# Patient Record
Sex: FEMALE | Race: BLACK OR AFRICAN AMERICAN | NOT HISPANIC OR LATINO | Employment: FULL TIME | URBAN - METROPOLITAN AREA
[De-identification: names, ages, dates, MRNs, and addresses within clinical notes are randomized per-mention and may not be internally consistent; named-entity substitution may affect disease eponyms.]

---

## 2017-07-18 DIAGNOSIS — R06.02 SOB (SHORTNESS OF BREATH): Primary | ICD-10-CM

## 2017-07-19 LAB
DLCOUNC-%PRED-PRE: 88 %
DLCOUNC-PRE: 21.06 ML/MIN/MMHG
DLCOUNC-PRED: 23.79 ML/MIN/MMHG
ERV-%PRED-PRE: 39 %
ERV-PRE: 0.43 L
ERV-PRED: 1.08 L
EXPTIME-PRE: 7.01 SEC
FEF2575-%PRED-POST: 81 %
FEF2575-%PRED-PRE: 42 %
FEF2575-POST: 2.44 L/SEC
FEF2575-PRE: 1.27 L/SEC
FEF2575-PRED: 2.99 L/SEC
FEFMAX-%PRED-PRE: 52 %
FEFMAX-PRE: 3.68 L/SEC
FEFMAX-PRED: 6.96 L/SEC
FEV1-%PRED-PRE: 49 %
FEV1-PRE: 1.39 L
FEV1FEV6-PRE: 80 %
FEV1FEV6-PRED: 84 %
FEV1FVC-PRE: 80 %
FEV1FVC-PRED: 83 %
FEV1SVC-PRE: 81 %
FEV1SVC-PRED: 76 %
FIFMAX-PRE: 2.86 L/SEC
FRCPLETH-%PRED-PRE: 90 %
FRCPLETH-PRE: 2.43 L
FRCPLETH-PRED: 2.68 L
FVC-%PRED-PRE: 51 %
FVC-PRE: 1.75 L
FVC-PRED: 3.38 L
IC-%PRED-PRE: 49 %
IC-PRE: 1.3 L
IC-PRED: 2.6 L
RVPLETH-%PRED-PRE: 126 %
RVPLETH-PRE: 2.01 L
RVPLETH-PRED: 1.59 L
TLCPLETH-%PRED-PRE: 75 %
TLCPLETH-PRE: 3.73 L
TLCPLETH-PRED: 4.94 L
VA-%PRED-PRE: 59 %
VA-PRE: 2.99 L
VC-%PRED-PRE: 46 %
VC-PRE: 1.72 L
VC-PRED: 3.68 L

## 2017-07-28 ENCOUNTER — HOSPITAL ENCOUNTER (OUTPATIENT)
Dept: GENERAL RADIOLOGY | Facility: CLINIC | Age: 42
Discharge: HOME | End: 2017-07-28
Attending: INTERNAL MEDICINE | Admitting: INTERNAL MEDICINE
Payer: COMMERCIAL

## 2017-07-28 DIAGNOSIS — R76.11 MANTOUX: POSITIVE: ICD-10-CM

## 2017-07-28 PROCEDURE — 71020 XR CHEST 2 VW: CPT

## 2018-06-14 NOTE — TELEPHONE ENCOUNTER
FUTURE VISIT INFORMATION      FUTURE VISIT INFORMATION:    Date: 6/18/18    Time: 2:30    Location:   REFERRAL INFORMATION:    Referring provider:  Self    Referring providers clinic:      Reason for visit/diagnosis: Rt shoulder pain

## 2018-06-18 ENCOUNTER — PRE VISIT (OUTPATIENT)
Dept: ORTHOPEDICS | Facility: CLINIC | Age: 43
End: 2018-06-18

## 2018-06-18 ENCOUNTER — OFFICE VISIT (OUTPATIENT)
Dept: ORTHOPEDICS | Facility: CLINIC | Age: 43
End: 2018-06-18
Payer: COMMERCIAL

## 2018-06-18 VITALS — SYSTOLIC BLOOD PRESSURE: 114 MMHG | HEART RATE: 67 BPM | DIASTOLIC BLOOD PRESSURE: 77 MMHG | RESPIRATION RATE: 16 BRPM

## 2018-06-18 DIAGNOSIS — M25.311 INSTABILITY OF RIGHT SHOULDER JOINT: Primary | ICD-10-CM

## 2018-06-18 NOTE — LETTER
6/18/2018      RE: Berkley Connelly  [address]  [address]  [place] [address]       She is here today actually being referred for a second opinion for surgical repair of her unstable right shoulder.  She has seen Dr. Clifford in the past.  I believe that she is seeing her today.  There is no charge for today's visit.  I placed a referral and will schedule an appointment for a second opinion by Dr. Clifford.         Vladimir Gant MD

## 2018-06-18 NOTE — MR AVS SNAPSHOT
After Visit Summary   2018    Berkley Connelly    MRN: [id number]           Patient Information     Date Of Birth          1975        Visit Information        Provider Department      2018 2:30 PM Vladimir Gant MD [facility]        Today's Diagnoses     Instability of right shoulder joint    -  1       Follow-ups after your visit        Your next 10 appointments already scheduled     2018  2:10 PM CDT   (Arrive by 1:55 PM)   NEW SHOULDER with Adrien Hampton MD   [facility] ([facility])    [address] [place] [id number] [phone]              Who to contact     Please call your clinic at [phone] to:    Ask questions about your health    Make or cancel appointments    Discuss your medicines    Learn about your test results    Speak to your doctor            Additional Information About Your Visit        MyChart Information     [employer] is an electronic gateway that provides easy, online access to your medical records. With [employer], you can request a clinic appointment, read your test results, renew a prescription or communicate with your care team.     To sign up for [employer] visit the website at [email or web address]   You will be asked to enter the access code listed below, as well as some personal information. Please follow the directions to create your username and password.     Your access code is: [email or web address]  Expires: 2018  6:30 AM     Your access code will  in 90 days. If you need help or a new code, please contact your [facility] or call [phone] for assistance.        Care EveryWhere ID     This is your Care EveryWhere ID. This could be used by other organizations to access your [facility] medical records  [id number]        Your Vitals Were     Pulse Respirations                67 16           Blood Pressure  from Last 3 Encounters:   06/18/18 114/77   12/31/13 (!) 136/98   05/24/13 124/77    Weight from Last 3 Encounters:   12/31/13 167 lb (75.8 kg)   11/21/12 168 lb (76.2 kg)   09/19/12 160 lb (72.6 kg)              Today, you had the following     No orders found for display       Primary Care Provider Fax #    Physician No Ref-Primary [phone]       No address on file        Equal Access to Services     MARTINE NUNO : Hadii aad ku hadasho Soomaali, waaxda luqadaha, qaybta kaalmada adeegyada, waxay katerinein haymadain irma atkinson . So [facility] [phone].    ATENCIÓN: Si habla español, tiene a muñoz disposición servicios gratuitos de asistencia lingüística. Llame al [phone].    We comply with applicable federal civil rights laws and [place] laws. We do not discriminate on the basis of race, color, national origin, age, disability, sex, sexual orientation, or gender identity.            Thank you!     Thank you for choosing [facility]  for your care. Our goal is always to provide you with excellent care. Hearing back from our patients is one way we can continue to improve our services. Please take a few minutes to complete the written survey that you may receive in the mail after your visit with us. Thank you!             Your Updated Medication List - Protect others around you: Learn how to safely use, store and throw away your medicines at www.disposemymeds.org.          This list is accurate as of 6/18/18  3:01 PM.  Always use your most recent med list.                   Brand Name Dispense Instructions for use Diagnosis    acetaminophen-codeine 300-30 MG per tablet    TYLENOL WITH CODEINE #3    15 tablet    Take 1-2 tablets by mouth every 6 hours as needed for pain        hydroquinone 4 % Crea      Externally apply  topically.

## 2018-06-18 NOTE — PROGRESS NOTES
She is here today actually being referred for a second opinion for surgical repair of her unstable right shoulder.  She has seen Dr. Clifford in the past.  I believe that she is seeing her today.  There is no charge for today's visit.  I placed a referral and will schedule an appointment for a second opinion by Dr. Clifford.

## 2018-06-28 ENCOUNTER — PRE VISIT (OUTPATIENT)
Dept: ORTHOPEDICS | Facility: CLINIC | Age: 43
End: 2018-06-28

## 2018-06-28 NOTE — TELEPHONE ENCOUNTER
FUTURE VISIT INFORMATION      FUTURE VISIT INFORMATION:    Date: 7/9    Time: 2:10    [place] [facility] [place]  REFERRAL INFORMATION:    Referring provider:      Referring providers clinic:      Reason for visit/diagnosis  R shoulder pain    RECORDS REQUESTED FROM:       Clinic name Comments Records Status Imaging Status   [facility] [place]   [facility] PT      [facility] Previous pt of Dr Clifford                         RECORDS STATUS

## 2018-07-05 DIAGNOSIS — G89.29 CHRONIC RIGHT SHOULDER PAIN: Primary | ICD-10-CM

## 2018-07-05 DIAGNOSIS — M25.511 CHRONIC RIGHT SHOULDER PAIN: Primary | ICD-10-CM

## 2018-10-15 ENCOUNTER — HOSPITAL ENCOUNTER (OUTPATIENT)
Dept: GENERAL RADIOLOGY | Facility: CLINIC | Age: 43
Discharge: HOME | End: 2018-10-15
Attending: NURSE PRACTITIONER | Admitting: NURSE PRACTITIONER
Payer: COMMERCIAL

## 2018-10-15 DIAGNOSIS — Z11.1 TUBERCULOSIS SCREENING: ICD-10-CM

## 2018-10-15 PROCEDURE — 71046 X-RAY EXAM CHEST 2 VIEWS: CPT

## 2018-11-12 ENCOUNTER — OFFICE VISIT (OUTPATIENT)
Dept: ORTHOPEDICS | Facility: CLINIC | Age: 43
End: 2018-11-12
Payer: COMMERCIAL

## 2018-11-12 ENCOUNTER — RADIANT APPOINTMENT (OUTPATIENT)
Dept: GENERAL RADIOLOGY | Facility: CLINIC | Age: 43
End: 2018-11-12
Attending: ORTHOPAEDIC SURGERY
Payer: COMMERCIAL

## 2018-11-12 VITALS — HEIGHT: 62 IN | BODY MASS INDEX: 33.49 KG/M2 | WEIGHT: 182 LBS

## 2018-11-12 DIAGNOSIS — G89.29 CHRONIC RIGHT SHOULDER PAIN: ICD-10-CM

## 2018-11-12 DIAGNOSIS — M25.511 CHRONIC RIGHT SHOULDER PAIN: ICD-10-CM

## 2018-11-12 DIAGNOSIS — M25.311 INSTABILITY OF RIGHT SHOULDER JOINT: Primary | ICD-10-CM

## 2018-11-12 RX ORDER — FLUTICASONE PROPIONATE 220 UG/1
1 AEROSOL, METERED RESPIRATORY (INHALATION) 2 TIMES DAILY PRN
COMMUNITY
End: 2023-01-20

## 2018-11-12 NOTE — NURSING NOTE
Teaching Flowsheet   Relevant Diagnosis: Right shoulder instability  Teaching Topic: Pre-op for allograft reconstruction of glenoid bone loss -  Prior auth needed for allograft  and patient needs a negative nicotine test before surgery can be scheduled.  First test will be done after allograft is approved.     Person(s) involved in teaching:   Patient     Motivation Level:  Asks Questions: Yes  Cooperative: Yes  Receptive (willing/able to accept information): Yes  Any cultural factors/Tenriism beliefs that may influence understanding or compliance? No     Patient demonstrates understanding of the following:  Reason for the appointment, diagnosis and treatment plan: Yes  Knowledge of proper use of medications and conditions for which they are ordered (with special attention to potential side effects or drug interactions): Yes  Which situations necessitate calling provider and whom to contact: Yes     Teaching Concerns Addressed:   Patient aware of the necessity for smoking cessation  Pre-op H&P by PMD  States she will contact a friend to assist with cares and transportation after surgery     Proper use and care of sling x 6 weeks (medical equip, care aids, etc.): Yes  Nutritional needs and diet plan: Yes  Pain management techniques: Yes  Wound Care: Yes  How and/when to access community resources: Yes     Instructional Materials Used/Given: Pre-op packet, surgical soap     Time spent with patient: 15.
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
## 2018-11-12 NOTE — MR AVS SNAPSHOT
"              After Visit Summary   2018    Berkley Connelly    MRN: [id number]           Patient Information     Date Of Birth          1975        Visit Information        Provider Department      2018 1:30 PM Adrien Hampton MD [facility]        Today's Diagnoses     Instability of right shoulder joint    -  1       Follow-ups after your visit        Follow-up notes from your care team     Return in about 6 weeks (around 2018).      Who to contact     Please call your clinic at [phone] to:    Ask questions about your health    Make or cancel appointments    Discuss your medicines    Learn about your test results    Speak to your doctor            Additional Information About Your Visit        MyChart Information     [employer] is an electronic gateway that provides easy, online access to your medical records. With [employer], you can request a clinic appointment, read your test results, renew a prescription or communicate with your care team.     To sign up for [employer] visit the website at [email or web address]   You will be asked to enter the access code listed below, as well as some personal information. Please follow the directions to create your username and password.     Your access code is: [email or web address]  Expires: 2019  5:30 AM     Your access code will  in 90 days. If you need help or a new code, please contact your [facility] or call [phone] for assistance.        Care EveryWhere ID     This is your Care EveryWhere ID. This could be used by other organizations to access your [facility] medical records  [id number]        Your Vitals Were     Height BMI (Body Mass Index)                1.575 m (5' 2\") 33.29 kg/m2           Blood Pressure from Last 3 Encounters:   18 114/77   13 (!) 136/98   13 124/77    Weight from Last 3 Encounters:   18 82.6 kg (182 lb)   13 75.8 kg (167 lb) "   11/21/12 76.2 kg (168 lb)              We Performed the Following     Anna-Operative Worksheet (Shoulder)        Primary Care Provider Fax #    Physician No Ref-Primary [phone]       No address on file        Equal Access to Services     MARTINE NURYS : Madeline collins ku madyo Soakshatali, waaxda luqadaha, qaybta kaalmada adesantanayayumi, olu riveratevin trujillo. So [facility] [phone].    ATENCIÓN: Si habla español, tiene a muñoz disposición servicios gratuitos de asistencia lingüística. Llame al [phone].    We comply with applicable federal civil rights laws and [place] laws. We do not discriminate on the basis of race, color, national origin, age, disability, sex, sexual orientation, or gender identity.            Thank you!     Thank you for choosing [facility]  for your care. Our goal is always to provide you with excellent care. Hearing back from our patients is one way we can continue to improve our services. Please take a few minutes to complete the written survey that you may receive in the mail after your visit with us. Thank you!             Your Updated Medication List - Protect others around you: Learn how to safely use, store and throw away your medicines at www.disposemymeds.org.          This list is accurate as of 11/12/18 11:59 PM.  Always use your most recent med list.                   Brand Name Dispense Instructions for use Diagnosis    acetaminophen-codeine 300-30 MG per tablet    TYLENOL WITH CODEINE #3    15 tablet    Take 1-2 tablets by mouth every 6 hours as needed for pain        fluticasone 220 MCG/ACT Inhaler    FLOVENT HFA     Inhale 1 puff into the lungs 2 times daily as needed        hydroquinone 4 % Crea      Externally apply  topically.        mometasone-formoterol 100-5 MCG/ACT oral inhaler    DULERA     Inhale 2 puffs into the lungs 2 times daily
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
## 2018-12-04 ENCOUNTER — TELEPHONE (OUTPATIENT)
Dept: ORTHOPEDICS | Facility: CLINIC | Age: 43
End: 2018-12-04

## 2018-12-04 NOTE — TELEPHONE ENCOUNTER
Message left on patient's voicemail to call the clinic concerning surgery.  She will need to have a negative nicotine test before scheduling the procedure.

## 2021-05-31 ENCOUNTER — RECORDS - HEALTHEAST (OUTPATIENT)
Dept: ADMINISTRATIVE | Facility: CLINIC | Age: 46
End: 2021-05-31

## 2022-03-16 ENCOUNTER — PATIENT OUTREACH (OUTPATIENT)
Dept: CARE COORDINATION | Facility: CLINIC | Age: 47
End: 2022-03-16

## 2022-03-16 NOTE — PROGRESS NOTES
Clinic Care Coordination Contact  Program: Health Insurance   County: [facility] [place] Case #:  [facility] Worker:   Lucille #:   Subscriber #:   Renewal:  Date Applied:     FRW Outreach:  3/16/2022: Outreach attempted x 1. Left message on voicemail with call back information and requested return call.  Plan: FRW will call again within one week.       Health Insurance:    None    Referral/Screening:    Financial Resource Worker Follow Up    Goals:       Intervention and Education during outreach: n/a    FRW Plan: FRW will make outreach in 1 week

## 2022-03-21 ENCOUNTER — PATIENT OUTREACH (OUTPATIENT)
Dept: CARE COORDINATION | Facility: CLINIC | Age: 47
End: 2022-03-21

## 2022-03-21 NOTE — PROGRESS NOTES
Clinic Care Coordination Contact  Program: Health Insurance (call in morning)   County: [place]   Mnsure #:   Subscriber #:   Renewal:  Date Applied:     FRW Outreach:  3/24/2022: FRW called patient and we went through the MNs[employer] screening, patient appears to be within the guidelines and FRW had to capacity to complete the application on the call. We tried to set up a MNsure account and we got an error message. We called [employer] and spoke to Massiel who was unable to locate an account. We completed a paper MNsure glenroy together over the phone. FRW went over the next steps in the application process and e-mailed the application secure to [email or web address]. FRW will follow up in 3-4 weeks.   3/23/2022: FRW called patient and left a vm with call back information. FRW will make one more outreach next week.   3/21/2022: FRW received a vm from patient. FRW called patient back and left a vm with call back information. FRW will make one more outreach later this week.   3/16/2022: Outreach attempted x 1. Left message on voicemail with call back information and requested return call.  Plan: FRW will call again within one week.     Health Insurance Screening: MNSURE   1. Do you currently have health insurance, did you receive a renewal? None  2. If you applied through [employer], do you know your username/password? No  3. How many people in the household? 1  4. Do you file taxes, who do you file with? Yes, files on own  5. What is your monthly income (include all tax members)? None  6. Do you have access to insurance through an employer (if yes, need EIN)?  7. Do you have social security cards and/or green cards? Green Card     Health Insurance:    None    Financial Resource Worker Follow Up    Goals:   Goals Addressed as of 3/24/2022 at 10:09 AM                    3/21/22       Health Insurance (pt-stated)   50%    Added 3/24/22 by Lorraine Araya      Goal Statement: I will apply for health insruance within the next 2 weeks    Action steps to achieve this goal:  1. I applied for health insurance through [facility] on 3/24/2022.   2. I will receive notices in the mail regarding verification I may need to send and will return by the due date.   3. I understand I may receive a phone call or notice from [facility] for further information regarding my application.   4. I will connect with [facility] [phone] if I have any questions.   5. I will connect with my Financial Resource Worker at the [facility] at [phone] if I need any assistance.               Intervention and Education during outreach: n/a    FRW Plan: FRW will follow up in 3-4 weeks

## 2022-04-14 ENCOUNTER — PATIENT OUTREACH (OUTPATIENT)
Dept: CARE COORDINATION | Facility: CLINIC | Age: 47
End: 2022-04-14

## 2022-04-14 NOTE — PROGRESS NOTES
Clinic Care Coordination Contact  Program: Health Insurance (call in morning)   County: [place]   [employer] #:   Subscriber #:   Renewal:  Date Applied:     FRW Outreach:  4/14/2022: FRW called patient and she states she was able to print the application and faxed it the following day. Patient has not heard anything back yet on her application. FRW will check coverage and follow up in 2 weeks.   3/24/2022: FRW called patient and we went through the MNsure screening, patient appears to be within the guidelines and FRW had to capacity to complete the application on the call. We tried to set up a MNsure account and we got an error message. We called [employer] and spoke to Massiel who was unable to locate an account. We completed a paper MNsure glenroy together over the phone. FRW went over the next steps in the application process and e-mailed the application secure to [email or web address]. FRW will follow up in 3-4 weeks.   3/23/2022: FRW called patient and left a vm with call back information. FRW will make one more outreach next week.   3/21/2022: FRW received a vm from patient. FRW called patient back and left a vm with call back information. FRW will make one more outreach later this week.   3/16/2022: Outreach attempted x 1. Left message on voicemail with call back information and requested return call.  Plan: FRW will call again within one week.     Health Insurance Screening: MNSURE   1. Do you currently have health insurance, did you receive a renewal? None  2. If you applied through [employer], do you know your username/password? No  3. How many people in the household? 1  4. Do you file taxes, who do you file with? Yes, files on own  5. What is your monthly income (include all tax members)? None  6. Do you have access to insurance through an employer (if yes, need EIN)?  7. Do you have social security cards and/or green cards? Green Card     Health Insurance:    None    Financial Resource Worker Follow Up    Goals:     Goals Addressed as of 4/14/2022 at 8:55 AM                    Today    3/21/22       Health Insurance (pt-stated)   60%  50%    Added 3/24/22 by Lorraine Araya      Goal Statement: I will apply for health insruance within the next 2 weeks   Action steps to achieve this goal:  1. I applied for health insurance through [facility] on 3/24/2022.   2. I will receive notices in the mail regarding verification I may need to send and will return by the due date.   3. I understand I may receive a phone call or notice from [facility] for further information regarding my application.   4. I will connect with [facility] [phone] if I have any questions.   5. I will connect with my Financial Resource Worker at the [facility] at [phone] if I need any assistance.                 Intervention and Education during outreach: n/a    FRW Plan: FRW will check coverage and follow up in 2 weeks

## 2022-04-28 ENCOUNTER — PATIENT OUTREACH (OUTPATIENT)
Dept: CARE COORDINATION | Facility: CLINIC | Age: 47
End: 2022-04-28

## 2022-04-28 NOTE — PROGRESS NOTES
Clinic Care Coordination Contact  Program: Health Insurance (call in morning)   County: [place]   Mnsure #:   Subscriber #:   Renewal:  Date Applied:     FRW Outreach:  4/28/2022: FRW checked MNITS and did not find active coverage. FRW called patient and left a vm with call back information. FRW will check coverage and follow up in 2 weeks.  4/14/2022: FRW called patient and she states she was able to print the application and faxed it the following day. Patient has not heard anything back yet on her application. FRW will check coverage and follow up in 2 weeks.   3/24/2022: FRW called patient and we went through the MNsure screening, patient appears to be within the guidelines and FRW had to capacity to complete the application on the call. We tried to set up a MNsure account and we got an error message. We called [employer] and spoke to Massiel who was unable to locate an account. We completed a paper MNsure glenroy together over the phone. FRW went over the next steps in the application process and e-mailed the application secure to [email or web address]. FRW will follow up in 3-4 weeks.   3/23/2022: FRW called patient and left a vm with call back information. FRW will make one more outreach next week.   3/21/2022: FRW received a vm from patient. FRW called patient back and left a vm with call back information. FRW will make one more outreach later this week.   3/16/2022: Outreach attempted x 1. Left message on voicemail with call back information and requested return call.  Plan: FRW will call again within one week.     Health Insurance Screening: MNSURE   1. Do you currently have health insurance, did you receive a renewal? None  2. If you applied through [facility], do you know your username/password? No  3. How many people in the household? 1  4. Do you file taxes, who do you file with? Yes, files on own  5. What is your monthly income (include all tax members)? None  6. Do you have access to insurance through an  employer (if yes, need EIN)?  7. Do you have social security cards and/or green cards? Green Card     Health Insurance:    None    Financial Resource Worker Follow Up    Goals:    Goals Addressed as of 4/14/2022 at 8:55 AM                    Today    3/21/22       Health Insurance (pt-stated)   60%  50%    Added 3/24/22 by Lorraine Araya      Goal Statement: I will apply for health insruance within the next 2 weeks   Action steps to achieve this goal:  1. I applied for health insurance through [facility] on 3/24/2022.   2. I will receive notices in the mail regarding verification I may need to send and will return by the due date.   3. I understand I may receive a phone call or notice from [facility] for further information regarding my application.   4. I will connect with [facility] [phone] if I have any questions.   5. I will connect with my Financial Resource Worker at the [facility] at [phone] if I need any assistance.                 Intervention and Education during outreach: n/a    FRW Plan: FRW will check coverage and follow up in 2 weeks

## 2022-05-12 ENCOUNTER — PATIENT OUTREACH (OUTPATIENT)
Dept: CARE COORDINATION | Facility: CLINIC | Age: 47
End: 2022-05-12

## 2022-05-12 NOTE — PROGRESS NOTES
Clinic Care Coordination Contact  Program: Health Insurance (call in morning)   County: [place]   Mnsure #:   Subscriber #:   Renewal:  Date Applied:     FRW Outreach:  5/12/2022: FRW checked MNITS and did not find active coverage. FRW called patient and left a vm with call back information. FRW will make outreach in 1 week.  4/28/2022: FRW checked [place]ITS and did not find active coverage. FRW called patient and left a vm with call back information. FRW will check coverage and follow up in 2 weeks.  4/14/2022: FRW called patient and she states she was able to print the application and faxed it the following day. Patient has not heard anything back yet on her application. FRW will check coverage and follow up in 2 weeks.   3/24/2022: FRW called patient and we went through the MNsure screening, patient appears to be within the guidelines and FRW had to capacity to complete the application on the call. We tried to set up a MNsure account and we got an error message. We called [employer] and spoke to Massiel who was unable to locate an account. We completed a paper MNsure glenroy together over the phone. FRW went over the next steps in the application process and e-mailed the application secure to [email or web address]. FRW will follow up in 3-4 weeks.   3/23/2022: FRW called patient and left a vm with call back information. FRW will make one more outreach next week.   3/21/2022: FRW received a vm from patient. FRW called patient back and left a vm with call back information. FRW will make one more outreach later this week.   3/16/2022: Outreach attempted x 1. Left message on voicemail with call back information and requested return call.  Plan: FRW will call again within one week.     Health Insurance Screening: MNSURE   1. Do you currently have health insurance, did you receive a renewal? None  2. If you applied through [employer], do you know your username/password? No  3. How many people in the household? 1  4. Do you file  taxes, who do you file with? Yes, files on own  5. What is your monthly income (include all tax members)? None  6. Do you have access to insurance through an employer (if yes, need EIN)?  7. Do you have social security cards and/or green cards? Green Card     Health Insurance:    None    Financial Resource Worker Follow Up    Goals:    Goals Addressed as of 4/14/2022 at 8:55 AM                    Today    3/21/22       Health Insurance (pt-stated)   60%  50%    Added 3/24/22 by Lorraine Araya      Goal Statement: I will apply for health insruance within the next 2 weeks   Action steps to achieve this goal:  1. I applied for health insurance through [facility] on 3/24/2022.   2. I will receive notices in the mail regarding verification I may need to send and will return by the due date.   3. I understand I may receive a phone call or notice from [facility] for further information regarding my application.   4. I will connect with [facility] [phone] if I have any questions.   5. I will connect with my Financial Resource Worker at the [facility] at [phone] if I need any assistance.                 Intervention and Education during outreach: n/a    FRW Plan: FRW will check coverage and follow up in 1 week

## 2022-05-19 ENCOUNTER — PATIENT OUTREACH (OUTPATIENT)
Dept: CARE COORDINATION | Facility: CLINIC | Age: 47
End: 2022-05-19

## 2022-05-19 NOTE — PROGRESS NOTES
Clinic Care Coordination Contact  Program: Health Insurance (call in morning)   County: [place]   Mnsure #:   Subscriber #:   Renewal:  Date Applied:     FRW Outreach:  5/19/2022: FRW checked MNITS and did not find active coverage. FRW called patient and left a vm with call back information. FRW will make one more outreach in 1 week.   5/12/2022: FRW checked MNITS and did not find active coverage. FRW called patient and left a vm with call back information. FRW will make outreach in 1 week.  4/28/2022: FRW checked MNITS and did not find active coverage. FRW called patient and left a vm with call back information. FRW will check coverage and follow up in 2 weeks.  4/14/2022: FRW called patient and she states she was able to print the application and faxed it the following day. Patient has not heard anything back yet on her application. FRW will check coverage and follow up in 2 weeks.   3/24/2022: FRW called patient and we went through the MNsure screening, patient appears to be within the guidelines and FRW had to capacity to complete the application on the call. We tried to set up a MNsure account and we got an error message. We called [employer] and spoke to Massiel who was unable to locate an account. We completed a paper MNsure glenroy together over the phone. FRW went over the next steps in the application process and e-mailed the application secure to [email or web address]. FRW will follow up in 3-4 weeks.   3/23/2022: FRW called patient and left a vm with call back information. FRW will make one more outreach next week.   3/21/2022: FRW received a vm from patient. FRW called patient back and left a vm with call back information. FRW will make one more outreach later this week.   3/16/2022: Outreach attempted x 1. Left message on voicemail with call back information and requested return call.  Plan: FRW will call again within one week.     Health Insurance Screening: MNSURE   1. Do you currently have health  insurance, did you receive a renewal? None  2. If you applied through [facility], do you know your username/password? No  3. How many people in the household? 1  4. Do you file taxes, who do you file with? Yes, files on own  5. What is your monthly income (include all tax members)? None  6. Do you have access to insurance through an employer (if yes, need EIN)?  7. Do you have social security cards and/or green cards? Green Card     Health Insurance:    None    Financial Resource Worker Follow Up    Goals:    Goals Addressed as of 4/14/2022 at 8:55 AM                    Today    3/21/22       Health Insurance (pt-stated)   60%  50%    Added 3/24/22 by Lorraine Araya      Goal Statement: I will apply for health insruance within the next 2 weeks   Action steps to achieve this goal:  1. I applied for health insurance through [facility] on 3/24/2022.   2. I will receive notices in the mail regarding verification I may need to send and will return by the due date.   3. I understand I may receive a phone call or notice from [facility] for further information regarding my application.   4. I will connect with [facility] [phone] if I have any questions.   5. I will connect with my Financial Resource Worker at the [facility] at [phone] if I need any assistance.                 Intervention and Education during outreach: n/a    FRW Plan: FRW will check coverage and follow up in 1 week

## 2022-05-26 ENCOUNTER — PATIENT OUTREACH (OUTPATIENT)
Dept: CARE COORDINATION | Facility: CLINIC | Age: 47
End: 2022-05-26

## 2022-05-26 NOTE — PROGRESS NOTES
Clinic Care Coordination Contact  Program: Health Insurance (call in morning)   County: [place]   Mnsure #:   Subscriber #:   Renewal:  Date Applied:     FRW Outreach:  5/26/2022: FRW called patient and left a final vm with call back information as attempt x4 with no answer or return phone calls. FRW will resolve the FRW episode and remove patient from panel. Please refer for future needs.   5/19/2022: FRW checked MNITS and did not find active coverage. FRW called patient and left a vm with call back information. FRW will make one more outreach in 1 week.   5/12/2022: FRW checked MNITS and did not find active coverage. FRW called patient and left a vm with call back information. FRW will make outreach in 1 week.  4/28/2022: FRW checked MNITS and did not find active coverage. FRW called patient and left a vm with call back information. FRW will check coverage and follow up in 2 weeks.   4/14/2022: FRW called patient and she states she was able to print the application and faxed it the following day. Patient has not heard anything back yet on her application. FRW will check coverage and follow up in 2 weeks.   3/24/2022: FRW called patient and we went through the MNsure screening, patient appears to be within the guidelines and FRW had to capacity to complete the application on the call. We tried to set up a MNsure account and we got an error message. We called [employer] and spoke to Massiel who was unable to locate an account. We completed a paper MNsure glenroy together over the phone. FRW went over the next steps in the application process and e-mailed the application secure to [email or web address]. FRW will follow up in 3-4 weeks.   3/23/2022: FRW called patient and left a vm with call back information. FRW will make one more outreach next week.   3/21/2022: FRW received a vm from patient. FRW called patient back and left a vm with call back information. FRW will make one more outreach later this week.   3/16/2022:  Outreach attempted x 1. Left message on voicemail with call back information and requested return call.  Plan: FRW will call again within one week.     Health Insurance Screening: MNSURE   1. Do you currently have health insurance, did you receive a renewal? None  2. If you applied through Mnsure, do you know your username/password? No  3. How many people in the household? 1  4. Do you file taxes, who do you file with? Yes, files on own  5. What is your monthly income (include all tax members)? None  6. Do you have access to insurance through an employer (if yes, need EIN)?  7. Do you have social security cards and/or green cards? Green Card     Health Insurance:    None    Financial Resource Worker Follow Up    Goals:       Intervention and Education during outreach: n/a    FRW Plan: n/a

## 2022-06-01 ENCOUNTER — PATIENT OUTREACH (OUTPATIENT)
Dept: CARE COORDINATION | Facility: CLINIC | Age: 47
End: 2022-06-01

## 2022-06-01 NOTE — PROGRESS NOTES
Clinic Care Coordination Contact  Program: Health Insurance (call in morning)   County: [place]  [phone]  : Kris [phone]  Mnsure #: [id number]  Subscriber #:   Renewal:  Date Applied:      FRW Outreach:  6/2/2022: FRW called patient and she states she called [facility] yesterday. They asked her some questions regarding income but she wasn't sure what the status of her application is. We called [facility] together and spoke to a rep who states they have down she was receiving unemployment. We advised the rep she's never had unemployment. We called the worker, Kris, and left  vm with call back information. FRW will follow up in 1 week.   6/1/2022: FRW received a vm from patient stating she was sick with COVID and she thinks she was approved. FRW called patient and left a vm with call back information. FRW will try again next week.   5/26/2022: FRW called patient and left a final vm with call back information as attempt x4 with no answer or return phone calls. FRW will resolve the FRW episode and remove patient from panel. Please refer for future needs.   5/19/2022: FRW checked MNITS and did not find active coverage. FRW called patient and left a vm with call back information. FRW will make one more outreach in 1 week.   5/12/2022: FRW checked MNITS and did not find active coverage. FRW called patient and left a vm with call back information. FRW will make outreach in 1 week.  4/28/2022: FRW checked MNITS and did not find active coverage. FRW called patient and left a vm with call back information. FRW will check coverage and follow up in 2 weeks.   4/14/2022: FRW called patient and she states she was able to print the application and faxed it the following day. Patient has not heard anything back yet on her application. FRW will check coverage and follow up in 2 weeks.   3/24/2022: FRW called patient and we went through the MNsure screening, patient appears to be within the guidelines and FRW  had to capacity to complete the application on the call. We tried to set up a [employer] account and we got an error message. We called [employer] and spoke to Massiel who was unable to locate an account. We completed a paper [employer] glenroy together over the phone. FRW went over the next steps in the application process and e-mailed the application secure to [email or web address]. FRW will follow up in 3-4 weeks.   3/23/2022: FRW called patient and left a vm with call back information. FRW will make one more outreach next week.   3/21/2022: FRW received a vm from patient. FRW called patient back and left a vm with call back information. FRW will make one more outreach later this week.   3/16/2022: Outreach attempted x 1. Left message on voicemail with call back information and requested return call.  Plan: FRW will call again within one week.     Health Insurance Screening: MNSURE   1. Do you currently have health insurance, did you receive a renewal? None  2. If you applied through [employer], do you know your username/password? No  3. How many people in the household? 1  4. Do you file taxes, who do you file with? Yes, files on own  5. What is your monthly income (include all tax members)? None  6. Do you have access to insurance through an employer (if yes, need EIN)?  7. Do you have social security cards and/or green cards? Green Card     Health Insurance:    None    Financial Resource Worker Follow Up    Goals:       Intervention and Education during outreach: n/a    FRW Plan:FRW will follow up in 1 week

## 2022-06-09 ENCOUNTER — PATIENT OUTREACH (OUTPATIENT)
Dept: CARE COORDINATION | Facility: CLINIC | Age: 47
End: 2022-06-09
Payer: MEDICAID

## 2022-06-09 NOTE — PROGRESS NOTES
Clinic Care Coordination Contact  Program: Health Insurance (call in morning)   County: [place]  [phone]  : Kris [phone]  Mnsure #: [id number]  Subscriber #: [id number]  Renewal: 6-12 months   Date Applied:  3/24/2022    FRW Outreach:  6/9/2022: FRW checked MNITS and found active MA as of 5/1/2022. FRW added coverage to registration and she does not have a balance due at this time. FRW called patient and left a vm with call back information advising her of the approval. FRW will resolve the FRW episode and remove patient from panel. Please refer to FRW for future needs.   6/2/2022: FRW called patient and she states she called [facility] yesterday. They asked her some questions regarding income but she wasn't sure what the status of her application is. We called [facility] together and spoke to a rep who states they have down she was receiving unemployment. We advised the rep she's never had unemployment. We called the worker, Kris, and left  vm with call back information. FRW will follow up in 1 week.   6/1/2022: FRW received a vm from patient stating she was sick with COVID and she thinks she was approved. FRW called patient and left a vm with call back information. FRW will try again next week.   5/26/2022: FRW called patient and left a final vm with call back information as attempt x4 with no answer or return phone calls. FRW will resolve the FRW episode and remove patient from panel. Please refer for future needs.   5/19/2022: FRW checked MNITS and did not find active coverage. FRW called patient and left a vm with call back information. FRW will make one more outreach in 1 week.   5/12/2022: FRW checked MNITS and did not find active coverage. FRW called patient and left a vm with call back information. FRW will make outreach in 1 week.  4/28/2022: FRW checked MNITS and did not find active coverage. FRW called patient and left a vm with call back information. FRW will check coverage and  follow up in 2 weeks.   4/14/2022: FRW called patient and she states she was able to print the application and faxed it the following day. Patient has not heard anything back yet on her application. FRW will check coverage and follow up in 2 weeks.   3/24/2022: FRW called patient and we went through the MNsure screening, patient appears to be within the guidelines and FRW had to capacity to complete the application on the call. We tried to set up a MNsure account and we got an error message. We called [employer] and spoke to Massiel who was unable to locate an account. We completed a paper MNsure glenroy together over the phone. FRW went over the next steps in the application process and e-mailed the application secure to [email or web address]. FRW will follow up in 3-4 weeks.   3/23/2022: FRW called patient and left a vm with call back information. FRW will make one more outreach next week.   3/21/2022: FRW received a vm from patient. FRW called patient back and left a vm with call back information. FRW will make one more outreach later this week.   3/16/2022: Outreach attempted x 1. Left message on voicemail with call back information and requested return call.  Plan: FRW will call again within one week.     Health Insurance Screening: MNSURE   1. Do you currently have health insurance, did you receive a renewal? None  2. If you applied through Mnsure, do you know your username/password? No  3. How many people in the household? 1  4. Do you file taxes, who do you file with? Yes, files on own  5. What is your monthly income (include all tax members)? None  6. Do you have access to insurance through an employer (if yes, need EIN)?  7. Do you have social security cards and/or green cards? Green Card     Health Insurance:    Major Programs  This subscriber has eligibility for MA: Medical Assistance.  Elig Type AX: MA Early Expansion  Eligibility Begin Date: 05/01/2022  Eligibility End Date: --/--/----  This subscriber is  eligible for the following service types: Medical Care ,  Chiropractic ,  Dental Care ,  Hospital ,  Hospital - Inpatient ,  Hospital - Outpatient ,  Emergency Services ,  Pharmacy ,  Professional (Physician) Visit - Office ,  Vision (Optometry) ,  Mental Health ,  Urgent Care  Prepaid Health Plan  None    Financial Resource Worker Follow Up    Goals:    Goals Addressed as of 6/9/2022 at 8:55 AM                    Today    4/14/22       Health Insurance (pt-stated)   100%  60%    Added 3/24/22 by Lorraine Araya      Goal Statement: I will apply for health insruance within the next 2 weeks   Action steps to achieve this goal:  1. I applied for health insurance through [facility] on 3/24/2022.   2. I will receive notices in the mail regarding verification I may need to send and will return by the due date.   3. I understand I may receive a phone call or notice from [facility] for further information regarding my application.   4. I will connect with [facility] [phone] if I have any questions.   5. I will connect with my Financial Resource Worker at the [facility] at [phone] if I need any assistance.                 Intervention and Education during outreach: n/a    DAO Plan: n/a

## 2022-10-12 ENCOUNTER — OFFICE VISIT (OUTPATIENT)
Dept: FAMILY MEDICINE | Facility: CLINIC | Age: 47
End: 2022-10-12
Payer: COMMERCIAL

## 2022-10-12 VITALS
OXYGEN SATURATION: 100 % | DIASTOLIC BLOOD PRESSURE: 84 MMHG | SYSTOLIC BLOOD PRESSURE: 122 MMHG | HEART RATE: 71 BPM | BODY MASS INDEX: 33.42 KG/M2 | WEIGHT: 181.6 LBS | TEMPERATURE: 97.4 F | HEIGHT: 62 IN | RESPIRATION RATE: 14 BRPM

## 2022-10-12 DIAGNOSIS — F32.1 CURRENT MODERATE EPISODE OF MAJOR DEPRESSIVE DISORDER WITHOUT PRIOR EPISODE (H): ICD-10-CM

## 2022-10-12 DIAGNOSIS — R05.3 CHRONIC COUGH: ICD-10-CM

## 2022-10-12 DIAGNOSIS — M25.511 CHRONIC RIGHT SHOULDER PAIN: Primary | ICD-10-CM

## 2022-10-12 DIAGNOSIS — G89.29 CHRONIC RIGHT SHOULDER PAIN: Primary | ICD-10-CM

## 2022-10-12 DIAGNOSIS — Z12.31 VISIT FOR SCREENING MAMMOGRAM: ICD-10-CM

## 2022-10-12 LAB — TSH SERPL DL<=0.005 MIU/L-ACNC: 2.96 MU/L (ref 0.4–4)

## 2022-10-12 PROCEDURE — 36415 COLL VENOUS BLD VENIPUNCTURE: CPT | Performed by: PHYSICIAN ASSISTANT

## 2022-10-12 PROCEDURE — 99204 OFFICE O/P NEW MOD 45 MIN: CPT | Performed by: PHYSICIAN ASSISTANT

## 2022-10-12 PROCEDURE — 84443 ASSAY THYROID STIM HORMONE: CPT | Performed by: PHYSICIAN ASSISTANT

## 2022-10-12 RX ORDER — BUPROPION HYDROCHLORIDE 150 MG/1
TABLET ORAL
Qty: 113 TABLET | Refills: 0 | Status: SHIPPED | OUTPATIENT
Start: 2022-10-12 | End: 2023-01-20

## 2022-10-12 RX ORDER — IBUPROFEN 400 MG/1
400-800 TABLET, FILM COATED ORAL EVERY 8 HOURS PRN
Qty: 90 TABLET | Refills: 0 | Status: SHIPPED | OUTPATIENT
Start: 2022-10-12 | End: 2023-01-20

## 2022-10-12 RX ORDER — HYDROXYZINE PAMOATE 25 MG/1
25 CAPSULE ORAL 3 TIMES DAILY PRN
Qty: 60 CAPSULE | Refills: 1 | Status: SHIPPED | OUTPATIENT
Start: 2022-10-12 | End: 2023-01-20

## 2022-10-12 RX ORDER — ALBUTEROL SULFATE 90 UG/1
1-2 AEROSOL, METERED RESPIRATORY (INHALATION) EVERY 4 HOURS PRN
Qty: 8.5 G | Refills: 3 | Status: SHIPPED | OUTPATIENT
Start: 2022-10-12 | End: 2023-01-20

## 2022-10-12 RX ORDER — BENZONATATE 100 MG/1
100 CAPSULE ORAL 3 TIMES DAILY PRN
Qty: 25 CAPSULE | Refills: 0 | Status: SHIPPED | OUTPATIENT
Start: 2022-10-12 | End: 2023-01-20

## 2022-10-12 ASSESSMENT — PAIN SCALES - GENERAL: PAINLEVEL: MODERATE PAIN (5)

## 2022-10-12 ASSESSMENT — ASTHMA QUESTIONNAIRES: ACT_TOTALSCORE: 14
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
## 2022-10-12 NOTE — PATIENT INSTRUCTIONS
Jim Gooden,    Thank you for allowing [facility] to manage your care.    If you develop worsening/changing symptoms at any time, please call 911 or go to the emergency department for evaluation.    I ordered some lab work, please go to the laboratory to get your studies.    I sent your prescriptions to your pharmacy.    For cough and difficulty sleeping, please use benzonatate and hydroxyzine as prescribed. Do not use this medication while driving, operating machinery, with other sedating medications, or while drinking alcohol as it will make you drowsy.      Please allow 1-2 business days for our office to contact you in regards to your laboratory/radiological studies.  If not done so, I encourage you to login into [employer] ([email or web address] to review your results as well.     If you have any questions or concerns, please feel free to call us at [phone]    Sincerely,    Reji Holland PA-C    Did you know?      You can schedule a video visit for follow-up appointments as well as future appointments for certain conditions.  Please see the below link.     [email or web address]    If you have not already done so,  I encourage you to sign up for [employer] ([email or web address]).  This will allow you to review your results, securely communicate with a provider, and schedule virtual visits as well.

## 2022-10-12 NOTE — LETTER
October 12, 2022      Berkley Connelly  [address]    [place] [address]        Dear ,    We are writing to inform you of your test results.    Your test results fall within the expected range(s) or remain unchanged from previous results.  Please continue with current treatment plan.    Resulted Orders   TSH with free T4 reflex   Result Value Ref Range    TSH 2.96 0.40 - 4.00 mU/L       If you have any questions or concerns, please call the clinic at the number listed above.       Sincerely,      DHARA Graham

## 2022-11-10 PROBLEM — F43.10 PTSD (POST-TRAUMATIC STRESS DISORDER): Status: ACTIVE | Noted: 2022-11-10

## 2022-12-14 ENCOUNTER — TELEPHONE (OUTPATIENT)
Dept: OBGYN | Facility: CLINIC | Age: 47
End: 2022-12-14

## 2022-12-14 ENCOUNTER — OFFICE VISIT (OUTPATIENT)
Dept: OBGYN | Facility: CLINIC | Age: 47
End: 2022-12-14
Attending: OBSTETRICS & GYNECOLOGY
Payer: COMMERCIAL

## 2022-12-14 ENCOUNTER — LAB (OUTPATIENT)
Dept: LAB | Facility: CLINIC | Age: 47
End: 2022-12-14
Attending: OBSTETRICS & GYNECOLOGY
Payer: COMMERCIAL

## 2022-12-14 VITALS — HEART RATE: 112 BPM | WEIGHT: 207.7 LBS | DIASTOLIC BLOOD PRESSURE: 70 MMHG | SYSTOLIC BLOOD PRESSURE: 138 MMHG

## 2022-12-14 DIAGNOSIS — D62 ANEMIA DUE TO BLOOD LOSS, ACUTE: ICD-10-CM

## 2022-12-14 DIAGNOSIS — D25.1 INTRAMURAL, SUBMUCOUS, AND SUBSEROUS LEIOMYOMA OF UTERUS: ICD-10-CM

## 2022-12-14 DIAGNOSIS — D25.2 INTRAMURAL, SUBMUCOUS, AND SUBSEROUS LEIOMYOMA OF UTERUS: ICD-10-CM

## 2022-12-14 DIAGNOSIS — D25.0 INTRAMURAL, SUBMUCOUS, AND SUBSEROUS LEIOMYOMA OF UTERUS: ICD-10-CM

## 2022-12-14 DIAGNOSIS — D25.0 SUBMUCOUS MYOMA OF UTERUS: ICD-10-CM

## 2022-12-14 DIAGNOSIS — D25.0 SUBMUCOUS MYOMA OF UTERUS: Primary | ICD-10-CM

## 2022-12-14 LAB
ALBUMIN SERPL-MCNC: 3.4 G/DL (ref 3.4–5)
ALP SERPL-CCNC: 51 U/L (ref 40–150)
ALT SERPL W P-5'-P-CCNC: 11 U/L (ref 0–50)
ANION GAP SERPL CALCULATED.3IONS-SCNC: 6 MMOL/L (ref 3–14)
AST SERPL W P-5'-P-CCNC: 8 U/L (ref 0–45)
BILIRUB SERPL-MCNC: 0.3 MG/DL (ref 0.2–1.3)
BUN SERPL-MCNC: 8 MG/DL (ref 7–30)
CALCIUM SERPL-MCNC: 8.8 MG/DL (ref 8.5–10.1)
CHLORIDE BLD-SCNC: 108 MMOL/L (ref 94–109)
CO2 SERPL-SCNC: 23 MMOL/L (ref 20–32)
CREAT SERPL-MCNC: 0.46 MG/DL (ref 0.52–1.04)
ERYTHROCYTE [DISTWIDTH] IN BLOOD BY AUTOMATED COUNT: 22.8 % (ref 10–15)
GFR SERPL CREATININE-BSD FRML MDRD: >90 ML/MIN/1.73M2
GLUCOSE BLD-MCNC: 132 MG/DL (ref 70–99)
HCT VFR BLD AUTO: 17.9 % (ref 35–47)
HGB BLD-MCNC: 4.6 G/DL (ref 11.7–15.7)
MCH RBC QN AUTO: 16 PG (ref 26.5–33)
MCHC RBC AUTO-ENTMCNC: 25.7 G/DL (ref 31.5–36.5)
MCV RBC AUTO: 62 FL (ref 78–100)
PLATELET # BLD AUTO: 292 10E3/UL (ref 150–450)
POTASSIUM BLD-SCNC: 3.7 MMOL/L (ref 3.4–5.3)
PROT SERPL-MCNC: 6.8 G/DL (ref 6.8–8.8)
RBC # BLD AUTO: 2.88 10E6/UL (ref 3.8–5.2)
SODIUM SERPL-SCNC: 137 MMOL/L (ref 133–144)
WBC # BLD AUTO: 5.9 10E3/UL (ref 4–11)

## 2022-12-14 PROCEDURE — 99205 OFFICE O/P NEW HI 60 MIN: CPT | Performed by: OBSTETRICS & GYNECOLOGY

## 2022-12-14 PROCEDURE — 80053 COMPREHEN METABOLIC PANEL: CPT

## 2022-12-14 PROCEDURE — G0463 HOSPITAL OUTPT CLINIC VISIT: HCPCS | Performed by: OBSTETRICS & GYNECOLOGY

## 2022-12-14 PROCEDURE — 85027 COMPLETE CBC AUTOMATED: CPT

## 2022-12-14 PROCEDURE — 36415 COLL VENOUS BLD VENIPUNCTURE: CPT

## 2022-12-14 RX ORDER — IBUPROFEN 600 MG/1
600 TABLET, FILM COATED ORAL EVERY 6 HOURS PRN
Qty: 60 TABLET | Refills: 1 | Status: SHIPPED | OUTPATIENT
Start: 2022-12-14 | End: 2022-12-29

## 2022-12-14 RX ORDER — ACETAMINOPHEN 500 MG
500-1000 TABLET ORAL EVERY 6 HOURS PRN
Qty: 60 TABLET | Refills: 1 | Status: SHIPPED | OUTPATIENT
Start: 2022-12-14 | End: 2022-12-29

## 2022-12-14 RX ORDER — EPINEPHRINE 1 MG/ML
0.3 INJECTION, SOLUTION, CONCENTRATE INTRAVENOUS EVERY 5 MIN PRN
Status: CANCELLED | OUTPATIENT
Start: 2022-12-19

## 2022-12-14 RX ORDER — ALBUTEROL SULFATE 90 UG/1
1-2 AEROSOL, METERED RESPIRATORY (INHALATION)
Status: CANCELLED
Start: 2022-12-19

## 2022-12-14 RX ORDER — METHYLPREDNISOLONE SODIUM SUCCINATE 125 MG/2ML
125 INJECTION, POWDER, LYOPHILIZED, FOR SOLUTION INTRAMUSCULAR; INTRAVENOUS
Status: CANCELLED
Start: 2022-12-19

## 2022-12-14 RX ORDER — DIPHENHYDRAMINE HYDROCHLORIDE 50 MG/ML
50 INJECTION INTRAMUSCULAR; INTRAVENOUS
Status: CANCELLED
Start: 2022-12-19

## 2022-12-14 RX ORDER — HEPARIN SODIUM (PORCINE) LOCK FLUSH IV SOLN 100 UNIT/ML 100 UNIT/ML
5 SOLUTION INTRAVENOUS
Status: CANCELLED | OUTPATIENT
Start: 2022-12-19

## 2022-12-14 RX ORDER — HEPARIN SODIUM,PORCINE 10 UNIT/ML
5 VIAL (ML) INTRAVENOUS
Status: CANCELLED | OUTPATIENT
Start: 2022-12-19

## 2022-12-14 RX ORDER — HEPARIN SODIUM,PORCINE 10 UNIT/ML
5 VIAL (ML) INTRAVENOUS
Status: CANCELLED | OUTPATIENT
Start: 2022-12-14

## 2022-12-14 RX ORDER — HEPARIN SODIUM (PORCINE) LOCK FLUSH IV SOLN 100 UNIT/ML 100 UNIT/ML
5 SOLUTION INTRAVENOUS
Status: CANCELLED | OUTPATIENT
Start: 2022-12-14

## 2022-12-14 RX ORDER — ALBUTEROL SULFATE 0.83 MG/ML
2.5 SOLUTION RESPIRATORY (INHALATION)
Status: CANCELLED | OUTPATIENT
Start: 2022-12-19

## 2022-12-14 RX ORDER — MEPERIDINE HYDROCHLORIDE 25 MG/ML
25 INJECTION INTRAMUSCULAR; INTRAVENOUS; SUBCUTANEOUS EVERY 30 MIN PRN
Status: CANCELLED | OUTPATIENT
Start: 2022-12-19

## 2022-12-14 NOTE — LETTER
Date:December 15, 2022      Provider requested that no letter be sent. Do not send.       [facility]

## 2022-12-14 NOTE — PROGRESS NOTES
"48 yo P0 here for ongoing anemia and known large fibroid uterus.     Patient was seen \"during covid\" in [place] for menorrhagia and acute blood loss anemia. She received an iron infusion but no further workup of her condition. She can palpate her myomatous uterus and states it has increased.     Has never had a pelvic exam or pap smear. Has never engaged in sexual activity. She does not want to do a pelvic exam today as she is afraid it will be painful.     Denies any significant medical history except for palpitations with her anemia.     Past Medical History:   Diagnosis Date     Known health problems: none      Past Surgical History:   Procedure Laterality Date     NO HISTORY OF SURGERY       OB History    Para Term  AB Living   0 0 0 0 0 0   SAB IAB Ectopic Multiple Live Births   0 0 0 0 0     History reviewed. No pertinent family history.  Social History     Socioeconomic History     Marital status: Single     Spouse name: None     Number of children: None     Years of education: None     Highest education level: None   Tobacco Use     Smoking status: Never     Smokeless tobacco: Never   Substance and Sexual Activity     Alcohol use: Not Currently     Drug use: Never     Sexual activity: Never   Social History Narrative    22 moved to [facility] to be with her sister and receive health care. Unable to work due to severe anemia.     Katy Bear MD     /70   Pulse 112   Wt 94.2 kg (207 lb 11.2 oz)   Appears tired, pale  Lungs CTA   CV tachycardic, regular rhythm  Breast exam: Breasts: normal without suspicious masses, skin changes or axillary nodes, symmetric fibrous changes in both upper outer quadrants  Abdomen large firm nonmobile mass from symphysis to sternum. And laterally to abdominal edges.   Declines pelvic exam  Right LE : edematous, woody 3+. Left LE edema 2+.   Skin no lesions    hgb 4.6 (all other labs pending)    Assessment:    1. Massive presumably myomatous uterus " filling the abdominal cavity (concern it is contributing to LE edema and concern for left ureteral obstruction)  2. Menometrorrhagia  3. Severe blood loss anemia, chronic, symptomatic    Plan:    1. We discussed evaluation we need prior to discussing surgical route and options-- this includes pelvic MRI, pap smear, endometrial biopsy. Patient desires the pelvic exam and tests under anesthesia.     2. We discussed use of Lupron to stop the bleeding and allow time to complete the evaluation and scheduling of interventions. Prior auth was requested and patient will consider if it is approved. She is worried about side effects and we discussed the side effects of her acute and severe anemia are significantly worse than the SE of Lupron.     3. After the lab called with severely low hgb patient was counseled on emergent PRBC transfusion which she declines at this time and requests iron infusion instead. She states she has probably been at this same level for a long time. We discussed the low hgb is potentially lethal. She agrees to come to ED if acutely symptomatic and these were reviewed. Orders placed for both PRBC and iron infusion and appt made for 12/19/22.    4. HCM: pending our ongoing evaluation of her abdominal mass and anemia we will plan to re-evaluate for HCM such as Mammogram and vaccine status.     5. Given the size of her mass we may need to consult gyn onc and/or gen surgery as well.     Katy Bear MD

## 2022-12-14 NOTE — TELEPHONE ENCOUNTER
Received call from MRI , stating patient is currently there, requesting MRI today.  Also received notification from lab that hemoglobin is 4.6.      Per Dr. Bear, patient is to call and schedule MRI.  Patient also needs blood transfusion.      Called infusion center, they are unable to schedule patient until Monday.      Called patient with Dr. Bear.  Encouraged to head to emergency department for blood transfusion. Patient was given all information and declined blood transfusion- is requesting to stick with iron infusion only.      RN called infusion center and explained situation- they will call to get patient scheduled for iron infusions.

## 2022-12-14 NOTE — LETTER
"2022       RE: Anat Su  [address] [facility] [address]     Dear Colleague,    Thank you for referring your patient, Anat Su, to the [facility] [place] at [facility]. Please see a copy of my visit note below.    46 yo P0 here for ongoing anemia and known large fibroid uterus.     Patient was seen \"during covid\" in [place] for menorrhagia and acute blood loss anemia. She received an iron infusion but no further workup of her condition. She can palpate her myomatous uterus and states it has increased.     Has never had a pelvic exam or pap smear. Has never engaged in sexual activity. She does not want to do a pelvic exam today as she is afraid it will be painful.     Denies any significant medical history except for palpitations with her anemia.     Past Medical History:   Diagnosis Date     Known health problems: none      Past Surgical History:   Procedure Laterality Date     NO HISTORY OF SURGERY       OB History    Para Term  AB Living   0 0 0 0 0 0   SAB IAB Ectopic Multiple Live Births   0 0 0 0 0     History reviewed. No pertinent family history.  Social History     Socioeconomic History     Marital status: Single     Spouse name: None     Number of children: None     Years of education: None     Highest education level: None   Tobacco Use     Smoking status: Never     Smokeless tobacco: Never   Substance and Sexual Activity     Alcohol use: Not Currently     Drug use: Never     Sexual activity: Never   Social History Narrative    22 moved to \[facility]\"" to be with her sister and receive health care. Unable to work due to severe anemia.     Katy Bear MD     /70   Pulse 112   Wt 94.2 kg (207 lb 11.2 oz)   Appears tired, pale  Lungs CTA   CV tachycardic, regular rhythm  Breast exam: Breasts: normal without suspicious masses, skin changes or axillary nodes, symmetric fibrous changes in both " upper outer quadrants  Abdomen large firm nonmobile mass from symphysis to sternum. And laterally to abdominal edges.   Declines pelvic exam  Right LE : edematous, woody 3+. Left LE edema 2+.   Skin no lesions    hgb 4.6 (all other labs pending)    Assessment:    1. Massive presumably myomatous uterus filling the abdominal cavity (concern it is contributing to LE edema and concern for left ureteral obstruction)  2. Menometrorrhagia  3. Severe blood loss anemia, chronic, symptomatic    Plan:    1. We discussed evaluation we need prior to discussing surgical route and options-- this includes pelvic MRI, pap smear, endometrial biopsy. Patient desires the pelvic exam and tests under anesthesia.     2. We discussed use of Lupron to stop the bleeding and allow time to complete the evaluation and scheduling of interventions. Prior auth was requested and patient will consider if it is approved. She is worried about side effects and we discussed the side effects of her acute and severe anemia are significantly worse than the SE of Lupron.     3. After the lab called with severely low hgb patient was counseled on emergent PRBC transfusion which she declines at this time and requests iron infusion instead. She states she has probably been at this same level for a long time. We discussed the low hgb is potentially lethal. She agrees to come to ED if acutely symptomatic and these were reviewed. Orders placed for both PRBC and iron infusion and appt made for 12/19/22.    4. HCM: pending our ongoing evaluation of her abdominal mass and anemia we will plan to re-evaluate for HCM such as Mammogram and vaccine status.     5. Given the size of her mass we may need to consult gyn onc and/or gen surgery as well.     Katy Bear MD                Again, thank you for allowing me to participate in the care of your patient.      Sincerely,    Katy Bear MD

## 2022-12-15 ENCOUNTER — INFUSION THERAPY VISIT (OUTPATIENT)
Dept: INFUSION THERAPY | Facility: CLINIC | Age: 47
End: 2022-12-15
Attending: OBSTETRICS & GYNECOLOGY
Payer: COMMERCIAL

## 2022-12-15 VITALS — TEMPERATURE: 97.3 F | SYSTOLIC BLOOD PRESSURE: 145 MMHG | HEART RATE: 96 BPM | DIASTOLIC BLOOD PRESSURE: 76 MMHG

## 2022-12-15 DIAGNOSIS — D62 ANEMIA DUE TO BLOOD LOSS, ACUTE: Primary | ICD-10-CM

## 2022-12-15 PROCEDURE — 96366 THER/PROPH/DIAG IV INF ADDON: CPT

## 2022-12-15 PROCEDURE — 250N000011 HC RX IP 250 OP 636: Performed by: OBSTETRICS & GYNECOLOGY

## 2022-12-15 PROCEDURE — 258N000003 HC RX IP 258 OP 636: Performed by: OBSTETRICS & GYNECOLOGY

## 2022-12-15 PROCEDURE — 96365 THER/PROPH/DIAG IV INF INIT: CPT

## 2022-12-15 RX ORDER — MEPERIDINE HYDROCHLORIDE 25 MG/ML
25 INJECTION INTRAMUSCULAR; INTRAVENOUS; SUBCUTANEOUS EVERY 30 MIN PRN
Status: CANCELLED | OUTPATIENT
Start: 2022-12-17

## 2022-12-15 RX ORDER — ALBUTEROL SULFATE 90 UG/1
1-2 AEROSOL, METERED RESPIRATORY (INHALATION)
Status: CANCELLED
Start: 2022-12-17

## 2022-12-15 RX ORDER — HEPARIN SODIUM,PORCINE 10 UNIT/ML
5 VIAL (ML) INTRAVENOUS
Status: CANCELLED | OUTPATIENT
Start: 2022-12-17

## 2022-12-15 RX ORDER — EPINEPHRINE 1 MG/ML
0.3 INJECTION, SOLUTION, CONCENTRATE INTRAVENOUS EVERY 5 MIN PRN
Status: CANCELLED | OUTPATIENT
Start: 2022-12-17

## 2022-12-15 RX ORDER — ALBUTEROL SULFATE 0.83 MG/ML
2.5 SOLUTION RESPIRATORY (INHALATION)
Status: CANCELLED | OUTPATIENT
Start: 2022-12-17

## 2022-12-15 RX ORDER — HEPARIN SODIUM (PORCINE) LOCK FLUSH IV SOLN 100 UNIT/ML 100 UNIT/ML
5 SOLUTION INTRAVENOUS
Status: CANCELLED | OUTPATIENT
Start: 2022-12-17

## 2022-12-15 RX ORDER — DIPHENHYDRAMINE HYDROCHLORIDE 50 MG/ML
50 INJECTION INTRAMUSCULAR; INTRAVENOUS
Status: CANCELLED
Start: 2022-12-17

## 2022-12-15 RX ORDER — METHYLPREDNISOLONE SODIUM SUCCINATE 125 MG/2ML
125 INJECTION, POWDER, LYOPHILIZED, FOR SOLUTION INTRAMUSCULAR; INTRAVENOUS
Status: CANCELLED
Start: 2022-12-17

## 2022-12-15 RX ADMIN — IRON SUCROSE 300 MG: 20 INJECTION, SOLUTION INTRAVENOUS at 14:41

## 2022-12-15 ASSESSMENT — PAIN SCALES - GENERAL: PAINLEVEL: NO PAIN (0)

## 2022-12-15 NOTE — PROGRESS NOTES
Infusion Nursing Note:  Anat Su presents today for venofer 300 mg 1/3.    Patient seen by provider today: No   present during visit today: Not Applicable.    Note: States she had IV iron earlier this year (in another state).    Intravenous Access:  Peripheral IV placed.    Treatment Conditions:  Not Applicable.    Post Infusion Assessment:  Patient tolerated infusion without incident.  Blood return noted pre and post infusion.  Site patent and intact, free from redness, edema or discomfort.  No evidence of extravasations.  Access discontinued per protocol.     Discharge Plan:   Copy of AVS reviewed with patient and/or family.  Patient will return 12/20/22 for next appointment.  Printed info on iron sucrose was reviewed and provided for the patient.  Patient discharged in stable condition accompanied by: self.  Departure Mode: Ambulatory.      Brianna Lares

## 2022-12-20 ENCOUNTER — INFUSION THERAPY VISIT (OUTPATIENT)
Dept: INFUSION THERAPY | Facility: CLINIC | Age: 47
End: 2022-12-20
Attending: OBSTETRICS & GYNECOLOGY
Payer: COMMERCIAL

## 2022-12-20 ENCOUNTER — VIRTUAL VISIT (OUTPATIENT)
Dept: FAMILY MEDICINE | Facility: CLINIC | Age: 47
End: 2022-12-20
Payer: COMMERCIAL

## 2022-12-20 VITALS
SYSTOLIC BLOOD PRESSURE: 137 MMHG | HEART RATE: 101 BPM | TEMPERATURE: 98.2 F | RESPIRATION RATE: 18 BRPM | OXYGEN SATURATION: 99 % | DIASTOLIC BLOOD PRESSURE: 64 MMHG

## 2022-12-20 DIAGNOSIS — R73.9 HYPERGLYCEMIA: ICD-10-CM

## 2022-12-20 DIAGNOSIS — D25.9 UTERINE LEIOMYOMA, UNSPECIFIED LOCATION: ICD-10-CM

## 2022-12-20 DIAGNOSIS — D62 ANEMIA DUE TO BLOOD LOSS, ACUTE: Primary | ICD-10-CM

## 2022-12-20 PROCEDURE — 96366 THER/PROPH/DIAG IV INF ADDON: CPT

## 2022-12-20 PROCEDURE — 250N000011 HC RX IP 250 OP 636: Performed by: OBSTETRICS & GYNECOLOGY

## 2022-12-20 PROCEDURE — 99441 PR PHYSICIAN TELEPHONE EVALUATION 5-10 MIN: CPT | Mod: 95 | Performed by: FAMILY MEDICINE

## 2022-12-20 PROCEDURE — 96365 THER/PROPH/DIAG IV INF INIT: CPT

## 2022-12-20 PROCEDURE — 258N000003 HC RX IP 258 OP 636: Performed by: OBSTETRICS & GYNECOLOGY

## 2022-12-20 RX ORDER — HEPARIN SODIUM,PORCINE 10 UNIT/ML
5 VIAL (ML) INTRAVENOUS
Status: CANCELLED | OUTPATIENT
Start: 2022-12-21

## 2022-12-20 RX ORDER — MEPERIDINE HYDROCHLORIDE 25 MG/ML
25 INJECTION INTRAMUSCULAR; INTRAVENOUS; SUBCUTANEOUS EVERY 30 MIN PRN
Status: CANCELLED | OUTPATIENT
Start: 2022-12-21

## 2022-12-20 RX ORDER — DIPHENHYDRAMINE HYDROCHLORIDE 50 MG/ML
50 INJECTION INTRAMUSCULAR; INTRAVENOUS
Status: CANCELLED
Start: 2022-12-21

## 2022-12-20 RX ORDER — HEPARIN SODIUM (PORCINE) LOCK FLUSH IV SOLN 100 UNIT/ML 100 UNIT/ML
5 SOLUTION INTRAVENOUS
Status: CANCELLED | OUTPATIENT
Start: 2022-12-21

## 2022-12-20 RX ORDER — ALBUTEROL SULFATE 0.83 MG/ML
2.5 SOLUTION RESPIRATORY (INHALATION)
Status: CANCELLED | OUTPATIENT
Start: 2022-12-21

## 2022-12-20 RX ORDER — EPINEPHRINE 1 MG/ML
0.3 INJECTION, SOLUTION, CONCENTRATE INTRAVENOUS EVERY 5 MIN PRN
Status: CANCELLED | OUTPATIENT
Start: 2022-12-21

## 2022-12-20 RX ORDER — METHYLPREDNISOLONE SODIUM SUCCINATE 125 MG/2ML
125 INJECTION, POWDER, LYOPHILIZED, FOR SOLUTION INTRAMUSCULAR; INTRAVENOUS
Status: CANCELLED
Start: 2022-12-21

## 2022-12-20 RX ORDER — ALBUTEROL SULFATE 90 UG/1
1-2 AEROSOL, METERED RESPIRATORY (INHALATION)
Status: CANCELLED
Start: 2022-12-21

## 2022-12-20 RX ADMIN — IRON SUCROSE 300 MG: 20 INJECTION, SOLUTION INTRAVENOUS at 12:24

## 2022-12-20 RX ADMIN — SODIUM CHLORIDE 250 ML: 9 INJECTION, SOLUTION INTRAVENOUS at 14:10

## 2022-12-20 NOTE — PROGRESS NOTES
Anat is a 47 year old who is being evaluated via a billable telephone visit.        Distant Location (provider location):  On-site    Assessment & Plan     Anemia due to blood loss, acute  Uterine leiomyoma, unspecified location  Reviewed gynecology visit from earlier this month.   Reinforced plan for pelvic MRI per gynecology as well as ongoing iron infusions.   We can repeat her hemoglobin at her follow-up visit next week in person, though it may be too soon to see true effect of the IV iron.   She denies acute symptoms of her anemia, but this needs to be monitored closely.     Hyperglycemia  Noted on CMP.   No known hx of diabetes.   Will check A1c at her next visit next week.       Return in about 1 week (around 12/27/2022) for Labs.    Amanda Abernathy DO  [facility]    Subjective   Anat is a 47 year old, presenting for the following health issues:  Establish Care      HPI     Patient presents today to establish care.   Notes recent move to [place]. Has hx of fibroids and menorrhagia with hemoglobin of 4.6 at OB/Gyn visit.   Now s/p iron infusions x2 (next one scheduled 12/26). She declined transfusion.     She tells me overall that she feels well. Sometimes experiences palpitations which she attributes to her low hemoglobin. States she is tired when she has her period but otherwise no significant fatigue or shortness of breath.     Review of Systems   Constitutional, HEENT, cardiovascular, pulmonary, gi and gu systems are negative, except as otherwise noted.      Objective         Physical Exam   healthy, alert and no distress  PSYCH: Alert and oriented times 3; coherent speech, normal   rate and volume, able to articulate logical thoughts, able   to abstract reason, no tangential thoughts, no hallucinations   or delusions  Her affect is normal  RESP: No cough, no audible wheezing, able to talk in full sentences  Remainder of exam unable to be completed due to telephone visits    Lab on  12/14/2022   Component Date Value Ref Range Status     Sodium 12/14/2022 137  133 - 144 mmol/L Final     Potassium 12/14/2022 3.7  3.4 - 5.3 mmol/L Final     Chloride 12/14/2022 108  94 - 109 mmol/L Final     Carbon Dioxide (CO2) 12/14/2022 23  20 - 32 mmol/L Final     Anion Gap 12/14/2022 6  3 - 14 mmol/L Final     Urea Nitrogen 12/14/2022 8  7 - 30 mg/dL Final     Creatinine 12/14/2022 0.46 (L)  0.52 - 1.04 mg/dL Final     Calcium 12/14/2022 8.8  8.5 - 10.1 mg/dL Final     Glucose 12/14/2022 132 (H)  70 - 99 mg/dL Final     Alkaline Phosphatase 12/14/2022 51  40 - 150 U/L Final     AST 12/14/2022 8  0 - 45 U/L Final     ALT 12/14/2022 11  0 - 50 U/L Final     Protein Total 12/14/2022 6.8  6.8 - 8.8 g/dL Final     Albumin 12/14/2022 3.4  3.4 - 5.0 g/dL Final     Bilirubin Total 12/14/2022 0.3  0.2 - 1.3 mg/dL Final     GFR Estimate 12/14/2022 >90  >60 mL/min/1.73m2 Final    Effective December 21, 2021 eGFRcr in adults is calculated using the 2021 CKD-EPI creatinine equation which includes age and gender (Soledad hartmann al., NE, DOI: 10.1056/YKZCsx9425497)     WBC Count 12/14/2022 5.9  4.0 - 11.0 10e3/uL Final     RBC Count 12/14/2022 2.88 (L)  3.80 - 5.20 10e6/uL Final     Hemoglobin 12/14/2022 4.6 (LL)  11.7 - 15.7 g/dL Final     Hematocrit 12/14/2022 17.9 (L)  35.0 - 47.0 % Final     MCV 12/14/2022 62 (L)  78 - 100 fL Final     MCH 12/14/2022 16.0 (L)  26.5 - 33.0 pg Final     MCHC 12/14/2022 25.7 (L)  31.5 - 36.5 g/dL Final     RDW 12/14/2022 22.8 (H)  10.0 - 15.0 % Final     Platelet Count 12/14/2022 292  150 - 450 10e3/uL Final               Phone call duration: 7 minutes

## 2022-12-20 NOTE — PROGRESS NOTES
Infusion Nursing Note:  Anat Su presents today for Venofer 300 mg, 2/3.    Patient seen by provider today: No   present during visit today: Not Applicable.    Note: Patient reports Sx of Fatigue, and SOB with exertion. Continues to struggle with heavy vaginal bleeding. Patient declined full NS Bolus, but did have 50 mL to flush tubing.      Intravenous Access:  Peripheral IV placed right hand.     Treatment Conditions:  Not Applicable.    Post Infusion Assessment:  Patient tolerated infusion without incident.  No evidence of extravasations.  Access discontinued per protocol.     Discharge Plan:   Discharge instructions reviewed with: Patient.  Patient and/or family verbalized understanding of discharge instructions and all questions answered.  Patient discharged in stable condition accompanied by: self.  Departure Mode: Ambulatory.      Cassandra Beckett RN

## 2022-12-22 ENCOUNTER — HOSPITAL ENCOUNTER (OUTPATIENT)
Dept: MRI IMAGING | Facility: CLINIC | Age: 47
Discharge: HOME | End: 2022-12-22
Attending: OBSTETRICS & GYNECOLOGY | Admitting: OBSTETRICS & GYNECOLOGY
Payer: COMMERCIAL

## 2022-12-22 DIAGNOSIS — D25.2 INTRAMURAL, SUBMUCOUS, AND SUBSEROUS LEIOMYOMA OF UTERUS: ICD-10-CM

## 2022-12-22 DIAGNOSIS — D25.0 INTRAMURAL, SUBMUCOUS, AND SUBSEROUS LEIOMYOMA OF UTERUS: ICD-10-CM

## 2022-12-22 DIAGNOSIS — D25.1 INTRAMURAL, SUBMUCOUS, AND SUBSEROUS LEIOMYOMA OF UTERUS: ICD-10-CM

## 2022-12-22 PROCEDURE — 72197 MRI PELVIS W/O & W/DYE: CPT

## 2022-12-22 PROCEDURE — A9585 GADOBUTROL INJECTION: HCPCS | Performed by: OBSTETRICS & GYNECOLOGY

## 2022-12-22 PROCEDURE — 255N000002 HC RX 255 OP 636: Performed by: OBSTETRICS & GYNECOLOGY

## 2022-12-22 PROCEDURE — 72197 MRI PELVIS W/O & W/DYE: CPT | Mod: 26 | Performed by: RADIOLOGY

## 2022-12-22 RX ORDER — GADOBUTROL 604.72 MG/ML
0.1 INJECTION INTRAVENOUS ONCE
Status: COMPLETED | OUTPATIENT
Start: 2022-12-22 | End: 2022-12-22

## 2022-12-22 RX ADMIN — GADOBUTROL 9.4 ML: 604.72 INJECTION INTRAVENOUS at 07:07

## 2022-12-23 ENCOUNTER — TELEPHONE (OUTPATIENT)
Dept: OBGYN | Facility: CLINIC | Age: 47
End: 2022-12-23

## 2022-12-23 DIAGNOSIS — D25.2 SUBMUCOUS AND SUBSEROUS LEIOMYOMA OF UTERUS: Primary | ICD-10-CM

## 2022-12-23 DIAGNOSIS — D25.0 SUBMUCOUS AND SUBSEROUS LEIOMYOMA OF UTERUS: Primary | ICD-10-CM

## 2022-12-23 NOTE — PROGRESS NOTES
preop orders placed  Patient aware hgb needs to be >10 and still declining PRBC transfusion  Patient will continue to follow with [facility] for iron infusions  Patient will make RN appt for lupron at [facility]    Katy Bear MD

## 2022-12-23 NOTE — TELEPHONE ENCOUNTER
Health Call Center    Phone Message    May a detailed message be left on voicemail: yes     Reason for Call: Calling to find out next steps from MRI and if a surgery is going to be done. Please reach out. Thank you    Action Taken: Message routed to:  [facility] S    Travel Screening: Not Applicable       TC to patient:    Discussed myomectomy and risks of hysterectomy.   Still getting iron infusions  Discussed that sometimes myomectomy results in so much bleeding that cannot be stopped and hysterectomy would be indicated.            We also discussed need hgb > 10   Recommend depo lupron until surgery    Katy Bear MD

## 2022-12-26 ENCOUNTER — TELEPHONE (OUTPATIENT)
Dept: ENDOCRINOLOGY | Facility: CLINIC | Age: 47
End: 2022-12-26

## 2022-12-26 ENCOUNTER — INFUSION THERAPY VISIT (OUTPATIENT)
Dept: INFUSION THERAPY | Facility: CLINIC | Age: 47
End: 2022-12-26
Attending: OBSTETRICS & GYNECOLOGY
Payer: COMMERCIAL

## 2022-12-26 ENCOUNTER — TELEPHONE (OUTPATIENT)
Dept: OBGYN | Facility: CLINIC | Age: 47
End: 2022-12-26

## 2022-12-26 VITALS
RESPIRATION RATE: 16 BRPM | SYSTOLIC BLOOD PRESSURE: 133 MMHG | HEART RATE: 95 BPM | TEMPERATURE: 98.1 F | DIASTOLIC BLOOD PRESSURE: 70 MMHG

## 2022-12-26 DIAGNOSIS — D62 ANEMIA DUE TO BLOOD LOSS, ACUTE: Primary | ICD-10-CM

## 2022-12-26 PROCEDURE — 250N000011 HC RX IP 250 OP 636: Performed by: OBSTETRICS & GYNECOLOGY

## 2022-12-26 PROCEDURE — 96366 THER/PROPH/DIAG IV INF ADDON: CPT

## 2022-12-26 PROCEDURE — 258N000003 HC RX IP 258 OP 636: Performed by: OBSTETRICS & GYNECOLOGY

## 2022-12-26 PROCEDURE — 96365 THER/PROPH/DIAG IV INF INIT: CPT

## 2022-12-26 RX ORDER — HEPARIN SODIUM (PORCINE) LOCK FLUSH IV SOLN 100 UNIT/ML 100 UNIT/ML
5 SOLUTION INTRAVENOUS
Status: CANCELLED | OUTPATIENT
Start: 2022-12-26

## 2022-12-26 RX ORDER — ALBUTEROL SULFATE 90 UG/1
1-2 AEROSOL, METERED RESPIRATORY (INHALATION)
Status: CANCELLED
Start: 2022-12-26

## 2022-12-26 RX ORDER — MEPERIDINE HYDROCHLORIDE 25 MG/ML
25 INJECTION INTRAMUSCULAR; INTRAVENOUS; SUBCUTANEOUS EVERY 30 MIN PRN
Status: CANCELLED | OUTPATIENT
Start: 2022-12-26

## 2022-12-26 RX ORDER — DIPHENHYDRAMINE HYDROCHLORIDE 50 MG/ML
50 INJECTION INTRAMUSCULAR; INTRAVENOUS
Status: CANCELLED
Start: 2022-12-26

## 2022-12-26 RX ORDER — HEPARIN SODIUM,PORCINE 10 UNIT/ML
5 VIAL (ML) INTRAVENOUS
Status: CANCELLED | OUTPATIENT
Start: 2022-12-26

## 2022-12-26 RX ORDER — METHYLPREDNISOLONE SODIUM SUCCINATE 125 MG/2ML
125 INJECTION, POWDER, LYOPHILIZED, FOR SOLUTION INTRAMUSCULAR; INTRAVENOUS
Status: CANCELLED
Start: 2022-12-26

## 2022-12-26 RX ORDER — EPINEPHRINE 1 MG/ML
0.3 INJECTION, SOLUTION, CONCENTRATE INTRAVENOUS EVERY 5 MIN PRN
Status: CANCELLED | OUTPATIENT
Start: 2022-12-26

## 2022-12-26 RX ORDER — ALBUTEROL SULFATE 0.83 MG/ML
2.5 SOLUTION RESPIRATORY (INHALATION)
Status: CANCELLED | OUTPATIENT
Start: 2022-12-26

## 2022-12-26 RX ADMIN — IRON SUCROSE 300 MG: 20 INJECTION, SOLUTION INTRAVENOUS at 13:16

## 2022-12-26 ASSESSMENT — PAIN SCALES - GENERAL: PAINLEVEL: NO PAIN (0)

## 2022-12-26 NOTE — PROGRESS NOTES
Infusion Nursing Note:  Anat Su presents today for venofer 300mg 3/3 infusions.    Patient seen by provider today: No   present during visit today: Not Applicable.    Note: last dose, staff message sent to MD to let them know that pt finished therapy.    Intravenous Access:  Peripheral IV placed.      Post Infusion Assessment:  Patient tolerated infusion without incident.     Discharge Plan:   Patient and/or family verbalized understanding of discharge instructions and all questions answered.      Gabrielle Lao RN    Administrations This Visit     iron sucrose (VENOFER) 300 mg in 0.9% NaCl 250 mL intermittent infusion     Admin Date  12/26/2022 Action  New Bag Dose  300 mg Rate  166.7 mL/hr Route  Intravenous Administered By  Gabrielle Lao RN

## 2022-12-27 ENCOUNTER — TELEPHONE (OUTPATIENT)
Dept: OBGYN | Facility: CLINIC | Age: 47
End: 2022-12-27

## 2022-12-27 NOTE — TELEPHONE ENCOUNTER
----- Message from Katy Bear MD sent at 12/27/2022  9:56 AM CST -----  Regarding: needs to see [facility]  Options: Joe 10 at 10am   Jan 12 at 1pm  Jan 20 in the afternoon during [facility] clinic      Thank you

## 2022-12-27 NOTE — TELEPHONE ENCOUNTER
Patient called stating she will accept 1/10 at 10 AM.  Scheduled on google calendar and nurse schedule

## 2022-12-29 ENCOUNTER — OFFICE VISIT (OUTPATIENT)
Dept: FAMILY MEDICINE | Facility: CLINIC | Age: 47
End: 2022-12-29
Payer: COMMERCIAL

## 2022-12-29 ENCOUNTER — TELEPHONE (OUTPATIENT)
Dept: FAMILY MEDICINE | Facility: CLINIC | Age: 47
End: 2022-12-29

## 2022-12-29 VITALS
DIASTOLIC BLOOD PRESSURE: 85 MMHG | BODY MASS INDEX: 33.11 KG/M2 | WEIGHT: 206 LBS | SYSTOLIC BLOOD PRESSURE: 138 MMHG | RESPIRATION RATE: 18 BRPM | TEMPERATURE: 98.6 F | HEART RATE: 108 BPM | HEIGHT: 66 IN | OXYGEN SATURATION: 100 %

## 2022-12-29 DIAGNOSIS — Z00.00 HEALTHCARE MAINTENANCE: ICD-10-CM

## 2022-12-29 DIAGNOSIS — R73.9 HYPERGLYCEMIA: ICD-10-CM

## 2022-12-29 DIAGNOSIS — D25.1 INTRAMURAL LEIOMYOMA OF UTERUS: ICD-10-CM

## 2022-12-29 DIAGNOSIS — D62 ANEMIA DUE TO BLOOD LOSS, ACUTE: Primary | ICD-10-CM

## 2022-12-29 DIAGNOSIS — D64.9 SEVERE ANEMIA: ICD-10-CM

## 2022-12-29 LAB
ANION GAP SERPL CALCULATED.3IONS-SCNC: 11 MMOL/L (ref 7–15)
BUN SERPL-MCNC: 7.9 MG/DL (ref 6–20)
CALCIUM SERPL-MCNC: 9 MG/DL (ref 8.6–10)
CHLORIDE SERPL-SCNC: 101 MMOL/L (ref 98–107)
CREAT SERPL-MCNC: 0.43 MG/DL (ref 0.51–0.95)
DEPRECATED HCO3 PLAS-SCNC: 23 MMOL/L (ref 22–29)
ERYTHROCYTE [DISTWIDTH] IN BLOOD BY AUTOMATED COUNT: 30.9 % (ref 10–15)
GFR SERPL CREATININE-BSD FRML MDRD: >90 ML/MIN/1.73M2
GLUCOSE SERPL-MCNC: 111 MG/DL (ref 70–99)
HBA1C MFR BLD: 4.7 % (ref 0–5.6)
HCT VFR BLD AUTO: 25.3 % (ref 35–47)
HGB BLD-MCNC: 6.2 G/DL (ref 11.7–15.7)
MCH RBC QN AUTO: 18.6 PG (ref 26.5–33)
MCHC RBC AUTO-ENTMCNC: 24.5 G/DL (ref 31.5–36.5)
MCV RBC AUTO: 76 FL (ref 78–100)
PLATELET # BLD AUTO: 479 10E3/UL (ref 150–450)
POTASSIUM SERPL-SCNC: 4.9 MMOL/L (ref 3.4–5.3)
RBC # BLD AUTO: 3.34 10E6/UL (ref 3.8–5.2)
SODIUM SERPL-SCNC: 135 MMOL/L (ref 136–145)
WBC # BLD AUTO: 11.6 10E3/UL (ref 4–11)

## 2022-12-29 PROCEDURE — 80048 BASIC METABOLIC PNL TOTAL CA: CPT | Performed by: FAMILY MEDICINE

## 2022-12-29 PROCEDURE — 85027 COMPLETE CBC AUTOMATED: CPT | Performed by: FAMILY MEDICINE

## 2022-12-29 PROCEDURE — 82728 ASSAY OF FERRITIN: CPT | Performed by: FAMILY MEDICINE

## 2022-12-29 PROCEDURE — 99215 OFFICE O/P EST HI 40 MIN: CPT | Performed by: FAMILY MEDICINE

## 2022-12-29 PROCEDURE — 36415 COLL VENOUS BLD VENIPUNCTURE: CPT | Performed by: FAMILY MEDICINE

## 2022-12-29 PROCEDURE — 83036 HEMOGLOBIN GLYCOSYLATED A1C: CPT | Performed by: FAMILY MEDICINE

## 2022-12-29 RX ORDER — METAPROTERENOL SULFATE 10 MG
500 TABLET ORAL DAILY
Qty: 90 CAPSULE | Refills: 3 | Status: SHIPPED | OUTPATIENT
Start: 2022-12-29 | End: 2023-06-22

## 2022-12-29 RX ORDER — CHOLECALCIFEROL (VITAMIN D3) 50 MCG
1 TABLET ORAL DAILY
Qty: 90 TABLET | Refills: 3 | Status: SHIPPED | OUTPATIENT
Start: 2022-12-29 | End: 2023-05-01

## 2022-12-29 NOTE — PROGRESS NOTES
"  Assessment & Plan     Intramural leiomyoma of uterus  Anemia due to blood loss, acute    Patient presenting today for severe anemia with hgb 4.6 s/p IV iron x3, etiology of her large uterine fibroids causing menorrhagia.   - Hgb today is improved to 6.3. We discussed recommendation for blood transfusion. She declined. Will check ferritin and order IV iron as appropriate. She tells me she would be accepting of a blood transfusion in an emergency (such as hemorrhage during surgery) but otherwise wants to avoid this and prefers IV iron. We briefly discussed how low hemoglobin can affect your other organs and my reasoning for recommending transfusion since IV iron takes time for effect. She is aware her hgb needs to be >10 to have surgery.     - She is unsure about purusing Lupron injections. We explored this more. She feels that since her bleeding is stopped and she could have surgery, then she can avoid the Lupron. Discussed my worry that she could have more vaginal bleeding while waiting for surgery which would decrease her hemoglobin and further delay surgery. She is more open to Lupron injections after our conversation and is scheduled for this on 1/10/23 at [facility].     - Explored her feelings on surgical options. She wants myomectomy. Does not want hysterectomy. It is very important to her to retain her organs. Encouraged her to continue to discuss this with gyencology at follow-up. Re-iterated this would likely be a complicated surgery. She worries that because it is complicated that doctors will tell her can't have a myomectomy and \"take the easier way\" and recommend hysterectomy.       - CBC with platelets  - Basic metabolic panel  - CBC with platelets  - Basic metabolic panel  - Ferritin    Severe anemia  - CBC with platelets  - Basic metabolic panel  - CBC with platelets  - Basic metabolic panel  - Ferritin    Hyperglycemia  A1c normal. No follow-up needed.   - Hemoglobin A1c  - Hemoglobin A1c    Healthcare " "maintenance  - Omega-3 Fish Oil 500 MG capsule  Dispense: 90 capsule; Refill: 3  - vitamin D3 (CHOLECALCIFEROL) 50 mcg (2000 units) tablet  Dispense: 90 tablet; Refill: 3           BMI:   Estimated body mass index is 33.25 kg/m  as calculated from the following:    Height as of this encounter: 1.676 m (5' 6\").    Weight as of this encounter: 93.4 kg (206 lb).       Return in about 2 weeks (around 1/12/2023), or if symptoms worsen or fail to improve.    DO RUBEN Velarde [facility] SUSAN Coppola is a 47 year old, presenting for the following health issues:  RECHECK      HPI     Pt is 5 years younger than stated age.     - Vaginal bleeding has stopped  - Is s/p 3 IV iron infusions  - Feeling better, more energy.   - would like prescriptions for vitamin D and fish oil.     Review of Systems   Constitutional, HEENT, cardiovascular, pulmonary, gi and gu systems are negative, except as otherwise noted.      Objective    /85   Pulse 108   Temp 98.6  F (37  C)   Resp 18   Ht 1.676 m (5' 6\")   Wt 93.4 kg (206 lb)   SpO2 100%   BMI 33.25 kg/m    Body mass index is 33.25 kg/m .  Physical Exam  Constitutional:       General: She is not in acute distress.  HENT:      Head: Normocephalic and atraumatic.   Eyes:      Conjunctiva/sclera: Conjunctivae normal.   Pulmonary:      Effort: Pulmonary effort is normal.   Abdominal:      Comments: Nontender. Distended abdomen with fundus palpable in epigastrium.   Musculoskeletal:      Cervical back: Neck supple.   Skin:     General: Skin is warm and dry.   Neurological:      Mental Status: She is alert and oriented to person, place, and time.   Psychiatric:         Judgment: Judgment normal.                    "

## 2022-12-29 NOTE — TELEPHONE ENCOUNTER
Reviewed. Results not significantly different from value in our lab. No change in plan.   Amanda Abernathy,

## 2022-12-29 NOTE — TELEPHONE ENCOUNTER
RN received call from [facility] lab about critical hgb of 6.2.     Provider aware-had hgb run in clinic and results at 6.3    Shameka Rivas RN

## 2023-01-02 LAB — FERRITIN SERPL-MCNC: 76 NG/ML (ref 6–175)

## 2023-01-03 NOTE — TELEPHONE ENCOUNTER
Patient call  and would like to know what is my ferritin level? And if   she is going to be  transfuse  Blood or not.    Socorro Johnson RN

## 2023-01-04 NOTE — TELEPHONE ENCOUNTER
Please call patient:     There was a delay in getting her ferritin results. Her ferritin in is a low normal range and I will work with our pharmacist to see about additional IV iron. I do recommend a blood transfusion (she had declined this previously). If she is now interested in blood transfusion (which I still recommend given her hemoglobin is <7), this would help her feel better faster and get her hemoglobin to a goal range faster to get her closer to potential surgery.     Addendum to above, I discussed with pharmacy. They recommended repeating a hemoglobin 2-3 weeks after completing iron and then reassessing how much she needs. I have placed a future order for hemoglobin. She can do this at any [place] lab (including when she is seen at gyn on 1/10/22 at [facility] if that is convienent for her).     Amanda Abernathy, DO

## 2023-01-05 ENCOUNTER — TELEPHONE (OUTPATIENT)
Dept: FAMILY MEDICINE | Facility: CLINIC | Age: 48
End: 2023-01-05

## 2023-01-05 NOTE — TELEPHONE ENCOUNTER
Pt called about results. RN relayed note from PCP Discussed with pharm D. Recommendation to repeat hgb 2-3 weeks after last venofer infusion and then re-ordering iron pending those results. Phone message sent to triage RN to contact patient with plan. Amanda Abernathy DO    Pt scheduled lab visit 1/12    Pt states she is wanting more IV iron infusions. Can we order more now or wait for hgb?    Shameka Rivas RN

## 2023-01-05 NOTE — TELEPHONE ENCOUNTER
Patient called the [facility] requesting result and relay the message below.      Discussed with pharm D. Recommendation to repeat hgb 2-3 weeks after last venofer infusion and then re-ordering iron pending those results. Phone message sent to triage RN to contact patient with plan. DO Socorro Velarde, RN

## 2023-01-06 NOTE — TELEPHONE ENCOUNTER
If she has had an additional episode of vaginal bleeding since I last saw her, then I will re-order the iron infusion. If not, I recommend we wait until we see the full effect of the IV iron she has already gotten since her ferritin (measure of iron storage) is in a normal range.       Message attached to 12/26/22 telephone encounter copied here for completeness since there are multiple phone encounters regarding her results:     There was a delay in getting her ferritin results. Her ferritin in is a low normal range and I will work with our pharmacist to see about additional IV iron. I do recommend a blood transfusion (she had declined this previously). If she is now interested in blood transfusion (which I still recommend given her hemoglobin is <7), this would help her feel better faster and get her hemoglobin to a goal range faster to get her closer to potential surgery.      Addendum to above, I discussed with pharmacy. They recommended repeating a hemoglobin 2-3 weeks after completing iron and then reassessing how much she needs. I have placed a future order for hemoglobin. She can do this at any [facility] lab (including when she is seen at gyn on 1/10/22 at [facility] if that is convienent for her).     Amanda Abernathy, DO

## 2023-01-06 NOTE — TELEPHONE ENCOUNTER
RN attempted to call patient about below message from Dr. Abernathy, [facility] with clinic callback number.    Jaci Walden RN

## 2023-01-10 ENCOUNTER — PREP FOR PROCEDURE (OUTPATIENT)
Dept: OBGYN | Facility: CLINIC | Age: 48
End: 2023-01-10

## 2023-01-10 ENCOUNTER — ALLIED HEALTH/NURSE VISIT (OUTPATIENT)
Dept: OBGYN | Facility: CLINIC | Age: 48
End: 2023-01-10
Attending: OBSTETRICS & GYNECOLOGY
Payer: COMMERCIAL

## 2023-01-10 ENCOUNTER — HOSPITAL ENCOUNTER (OUTPATIENT)
Facility: CLINIC | Age: 48
End: 2023-01-10
Attending: OBSTETRICS & GYNECOLOGY | Admitting: OBSTETRICS & GYNECOLOGY
Payer: COMMERCIAL

## 2023-01-10 ENCOUNTER — HOSPITAL ENCOUNTER (INPATIENT)
Facility: CLINIC | Age: 48
Setting detail: SURGERY ADMIT
End: 2023-01-10
Attending: OBSTETRICS & GYNECOLOGY | Admitting: OBSTETRICS & GYNECOLOGY
Payer: COMMERCIAL

## 2023-01-10 ENCOUNTER — TELEPHONE (OUTPATIENT)
Dept: OBGYN | Facility: CLINIC | Age: 48
End: 2023-01-10

## 2023-01-10 VITALS
SYSTOLIC BLOOD PRESSURE: 138 MMHG | HEART RATE: 99 BPM | WEIGHT: 209 LBS | DIASTOLIC BLOOD PRESSURE: 75 MMHG | BODY MASS INDEX: 33.73 KG/M2

## 2023-01-10 DIAGNOSIS — D62 ANEMIA DUE TO BLOOD LOSS, ACUTE: ICD-10-CM

## 2023-01-10 DIAGNOSIS — D25.1 INTRAMURAL, SUBMUCOUS, AND SUBSEROUS LEIOMYOMA OF UTERUS: Primary | ICD-10-CM

## 2023-01-10 DIAGNOSIS — D25.0 SUBMUCOUS AND SUBSEROUS LEIOMYOMA OF UTERUS: ICD-10-CM

## 2023-01-10 DIAGNOSIS — D25.0 INTRAMURAL, SUBMUCOUS, AND SUBSEROUS LEIOMYOMA OF UTERUS: Primary | ICD-10-CM

## 2023-01-10 DIAGNOSIS — D25.2 SUBMUCOUS AND SUBSEROUS LEIOMYOMA OF UTERUS: ICD-10-CM

## 2023-01-10 DIAGNOSIS — D25.0 SUBMUCOUS MYOMA OF UTERUS: ICD-10-CM

## 2023-01-10 DIAGNOSIS — D25.2 INTRAMURAL, SUBMUCOUS, AND SUBSEROUS LEIOMYOMA OF UTERUS: Primary | ICD-10-CM

## 2023-01-10 PROCEDURE — 99215 OFFICE O/P EST HI 40 MIN: CPT | Performed by: OBSTETRICS & GYNECOLOGY

## 2023-01-10 PROCEDURE — G0463 HOSPITAL OUTPT CLINIC VISIT: HCPCS | Performed by: OBSTETRICS & GYNECOLOGY

## 2023-01-10 RX ORDER — ACETAMINOPHEN 325 MG/1
975 TABLET ORAL ONCE
Status: CANCELLED | OUTPATIENT
Start: 2023-01-10 | End: 2023-01-10

## 2023-01-10 ASSESSMENT — ANXIETY QUESTIONNAIRES
IF YOU CHECKED OFF ANY PROBLEMS ON THIS QUESTIONNAIRE, HOW DIFFICULT HAVE THESE PROBLEMS MADE IT FOR YOU TO DO YOUR WORK, TAKE CARE OF THINGS AT HOME, OR GET ALONG WITH OTHER PEOPLE: NOT DIFFICULT AT ALL
6. BECOMING EASILY ANNOYED OR IRRITABLE: NOT AT ALL
GAD7 TOTAL SCORE: 0
2. NOT BEING ABLE TO STOP OR CONTROL WORRYING: NOT AT ALL
5. BEING SO RESTLESS THAT IT IS HARD TO SIT STILL: NOT AT ALL
1. FEELING NERVOUS, ANXIOUS, OR ON EDGE: NOT AT ALL
3. WORRYING TOO MUCH ABOUT DIFFERENT THINGS: NOT AT ALL
GAD7 TOTAL SCORE: 0
7. FEELING AFRAID AS IF SOMETHING AWFUL MIGHT HAPPEN: NOT AT ALL

## 2023-01-10 ASSESSMENT — PAIN SCALES - GENERAL: PAINLEVEL: NO PAIN (0)

## 2023-01-10 ASSESSMENT — PATIENT HEALTH QUESTIONNAIRE - PHQ9
5. POOR APPETITE OR OVEREATING: NOT AT ALL
SUM OF ALL RESPONSES TO PHQ QUESTIONS 1-9: 0

## 2023-01-10 NOTE — PROGRESS NOTES
"49 yo (stated age 39 yo)  P0 here for ongoing anemia and known large fibroid uterus in the setting of severe chronic blood loss anemia (hgb 4.6 ---> 6.2 with iron infusion).     She has not yet received her Lupron injection. States she has not bled for 10 days and thus didn't think she'd need it. We discussed timing and I stongly advised receiving it.     Today we discussed again hysterectomy versus myomectomy and my fear/concern that the myomectomy will result in massive blood loss during the surgery necessitating multiple units of PRBC and possible ICU stay as well as perhaps result in an inability to reassemble the uterus into a functional organ. Multiple images and stock photos were used in an attempt to convey this concern. Oddly, patient agrees to intraoperative transfusion but not preoperative transfusion.     We discussed that generally before myomectomy or hysterectomy we need to have completed a pap smear and endometrial biopsy to rule out definitive cancer before proceeding. Patient has never had any pelvic exam and requests this under anesthesia.     We discussed that there is a small bur possible chance that even if pap and embx are normal one or more of the fibroids could be cancer and we would not know until after the removal of the fibroids/uterus.     This case has been reviewed with colleagues and gyn onc.       Recent History:    Patient was seen \"during covid\" in [place] for menorrhagia and acute blood loss anemia. She received an iron infusion but no further workup of her condition. She can palpate her myomatous uterus and states it has increased.      Has never had a pelvic exam or pap smear. Has never engaged in sexual activity. She does not want to do a pelvic exam today as she is afraid it will be painful.      Denies any significant medical history except for palpitations with her anemia.      Past Medical History        Past Medical History:   Diagnosis Date     Known health problems: none   "         Past Surgical History         Past Surgical History:   Procedure Laterality Date     NO HISTORY OF SURGERY                     OB History    Para Term  AB Living   0 0 0 0 0 0   SAB IAB Ectopic Multiple Live Births    0 0 0 0 0       Family History   History reviewed. No pertinent family history.     Social History      Socioeconomic History     Marital status: Single       Spouse name: None     Number of children: None     Years of education: None     Highest education level: None   Tobacco Use     Smoking status: Never     Smokeless tobacco: Never   Substance and Sexual Activity     Alcohol use: Not Currently     Drug use: Never     Sexual activity: Never   Social History Narrative     24 moved to [facility] to be with her sister and receive health care. Unable to work due to severe anemia.      Katy Bear MD      /70   Pulse 112   Wt 94.2 kg (207 lb 11.2 oz)   Appears tired, pale  HR 110s tachycardic, regular  LE edema unchanged: 3+ woody on left. 2+ on right.     See my 22 note for my full exam     Labs and MRI reviewed:  IMPRESSION:  Images are significantly degraded by patient motion artifact.     1.  Markedly enlarged uterus with greater than 15 intramural fibroids  throughout measuring up to 8.3 cm. One of the smaller fibroids is  subserosal. Multiple fibroids demonstrate areas of degeneration.  2.  Mild left hydrosalpinx, possibly due to mass effect from the  uterus.  3.  Small volume free pelvic fluid, likely physiologic.  4.  Diffusely abnormal hypointense marrow signal raises the question  of a marrow replacement process. Underlying hematologic malignancy and  diffuse red marrow reconversion are considerations.       HCM:  Mammogram not indicated as stated age is 40y  Vaccines up to date -- including flu and covid      Assessment/Plan:     1. Massive myomatous uterus filling the abdominal cavity  2. Menometrorrhagia: if she bleeds again she agrees to evert  urgent nurse visit for injection  3. Severe blood loss anemia, chronic, symptomatic -- Managed by Dr. Abernathy and is s/p 3 iron infusions with normalized ferritin and still low hgb. She may not respond to more iron infusions.   4. Will schedule exam, pap, and embx under anesthesia asap with myomectomy/EDGARDO to follow a few weeks later with 2 attendings. Both preop with primary care    Time spent in results review and discussion today: 51 minutes       Katy Bear MD

## 2023-01-11 ENCOUNTER — TELEPHONE (OUTPATIENT)
Dept: OBGYN | Facility: CLINIC | Age: 48
End: 2023-01-11

## 2023-01-11 NOTE — TELEPHONE ENCOUNTER
Left message for patient to call back and go over scheduled surgeries on 3/21 and 4/21.    Kathi Barrett  Clinical Services Assistant

## 2023-01-11 NOTE — TELEPHONE ENCOUNTER
RN discussed with patient. No more bleeding, patient to come to labs tomorrow for recheck.     Jaci Walden RN

## 2023-01-12 ENCOUNTER — TELEPHONE (OUTPATIENT)
Dept: FAMILY MEDICINE | Facility: CLINIC | Age: 48
End: 2023-01-12

## 2023-01-12 ENCOUNTER — TELEPHONE (OUTPATIENT)
Dept: OBGYN | Facility: CLINIC | Age: 48
End: 2023-01-12

## 2023-01-12 ENCOUNTER — LAB (OUTPATIENT)
Dept: LAB | Facility: CLINIC | Age: 48
End: 2023-01-12
Payer: COMMERCIAL

## 2023-01-12 DIAGNOSIS — D62 ANEMIA DUE TO BLOOD LOSS, ACUTE: Primary | ICD-10-CM

## 2023-01-12 DIAGNOSIS — D62 ANEMIA DUE TO BLOOD LOSS, ACUTE: ICD-10-CM

## 2023-01-12 LAB — HGB BLD-MCNC: 7.2 G/DL (ref 11.7–15.7)

## 2023-01-12 PROCEDURE — 36415 COLL VENOUS BLD VENIPUNCTURE: CPT

## 2023-01-12 PROCEDURE — 85018 HEMOGLOBIN: CPT

## 2023-01-12 RX ORDER — ALBUTEROL SULFATE 90 UG/1
1-2 AEROSOL, METERED RESPIRATORY (INHALATION)
Status: CANCELLED
Start: 2023-01-12

## 2023-01-12 RX ORDER — HEPARIN SODIUM,PORCINE 10 UNIT/ML
5 VIAL (ML) INTRAVENOUS
Status: CANCELLED | OUTPATIENT
Start: 2023-01-12

## 2023-01-12 RX ORDER — MEPERIDINE HYDROCHLORIDE 25 MG/ML
25 INJECTION INTRAMUSCULAR; INTRAVENOUS; SUBCUTANEOUS EVERY 30 MIN PRN
Status: CANCELLED | OUTPATIENT
Start: 2023-01-12

## 2023-01-12 RX ORDER — ALBUTEROL SULFATE 0.83 MG/ML
2.5 SOLUTION RESPIRATORY (INHALATION)
Status: CANCELLED | OUTPATIENT
Start: 2023-01-12

## 2023-01-12 RX ORDER — METHYLPREDNISOLONE SODIUM SUCCINATE 125 MG/2ML
125 INJECTION, POWDER, LYOPHILIZED, FOR SOLUTION INTRAMUSCULAR; INTRAVENOUS
Status: CANCELLED
Start: 2023-01-12

## 2023-01-12 RX ORDER — HEPARIN SODIUM (PORCINE) LOCK FLUSH IV SOLN 100 UNIT/ML 100 UNIT/ML
5 SOLUTION INTRAVENOUS
Status: CANCELLED | OUTPATIENT
Start: 2023-01-12

## 2023-01-12 RX ORDER — EPINEPHRINE 1 MG/ML
0.3 INJECTION, SOLUTION, CONCENTRATE INTRAVENOUS EVERY 5 MIN PRN
Status: CANCELLED | OUTPATIENT
Start: 2023-01-12

## 2023-01-12 RX ORDER — DIPHENHYDRAMINE HYDROCHLORIDE 50 MG/ML
50 INJECTION INTRAMUSCULAR; INTRAVENOUS
Status: CANCELLED
Start: 2023-01-12

## 2023-01-12 NOTE — TELEPHONE ENCOUNTER
Left message for patient to call back and confirm scheduled surgeries.    Kathi Barrett  Clinical Services Assistant

## 2023-01-12 NOTE — TELEPHONE ENCOUNTER
Per Ganzoni equation, has deficit of 1100mg iron to reach hemoglobin of 10. Will order additional 3x 300mg Venofer and plan to repeat hemoglobin 4 weeks after last infusion. MyChart sent to patient.     Amanda Abernathy DO

## 2023-01-16 ENCOUNTER — TELEPHONE (OUTPATIENT)
Dept: OBGYN | Facility: CLINIC | Age: 48
End: 2023-01-16
Payer: COMMERCIAL

## 2023-01-16 NOTE — TELEPHONE ENCOUNTER
Third call attempt to reach patient to confirm scheduled surgeries. No answer so voicemail was left.    Kathi Barrett  Clinical Services Assistant

## 2023-01-16 NOTE — LETTER
01/16/23    Patient: Anat Su  YOB: 1975    Thank you for choosing [facility] for your surgical procedures.      For your first procedure: You will enter the hospital as an outpatient, or same-day surgery patient, which means that you will enter the hospital the day of your surgery and leave that same day.    You procedure is scheduled on:    Date: March 21, 2023    Time: 10:35 AM    Please arrive at: 08:35 AM    Procedure: EXAM UNDER ANESTHESIA, PELVIS, pap smear, endometrial biopsy    MD: Katy Bear MD      For your second procedure: You will enter the hospital as a morning admission which means that you will spend at least one night in the hospital.    You procedure is scheduled on:    Date: April 21, 2023    Time: 09:30 AM    Please arrive at: 07:30 AM    Procedure: Open abdominal myomectomy, possible total abdominal hysterectomy, possible bilateral salpingectomy, possible cystoscopy     MD: Katy Bear MD    Go to:     [address] [address]      [phone]      [email or web address]    NOTE: The times noted above may change.  A nurse from the hospital pre-admission department will call to confirm your procedure.  He or she will answer any questions you have about the procedure and will provide you with instructions for taking medications the day of the procedure.  If you have not received a call, or if you have more questions please call us one working day before your procedure. If you need to cancel or reschedule your surgery please call [phone].    FOLLOW-UP/POST OPERATIVE VISIT    Please call now and schedule a follow-up postoperative appointment with your surgeon.     Your follow-up/postoperative visit is due approximately 2-4 weeks after your SECOND procedure.    For informational purposes only. Not to replace the advice  of your health care provider. Copyright   ,  [facility]. All rights reserved. Clinically reviewed by Suzanne Del Cid MD. [employer] [id number] - REV .  Preparing for Your Surgery  Getting started  A nurse will call you to review your health history and instructions. They will give you an arrival time based on your scheduled surgery time. Please be ready to share:    Your doctor's clinic name and phone number    Your medical, surgical, and anesthesia history    A list of allergies and sensitivities    A list of medicines, including herbal treatments and over-the-counter drugs    Whether the patient has a legal guardian (ask how to send us the papers in advance)  Please tell us if you're pregnant--or if there's any chance you might be pregnant. Some surgeries may injure a fetus (unborn baby), so they require a pregnancy test. Surgeries that are safe for a fetus don't always need a test, and you can choose whether to have one.   If you have a child who's having surgery, please ask for a copy of Preparing for Your Child's Surgery.    Preparing for surgery  1. Within 10 to 30 days of both of your surgeries: Have a pre-op exam (sometimes called an H&P, or History and Physical). This can be done at a clinic or pre-operative center.  ? If you're having a , you may not need this exam. Talk to your care team.  2. At your pre-op exam, talk to your care team about all medicines you take. If you need to stop any medicines before surgery, ask when to start taking them again.  ? We do this for your safety. Many medicines can make you bleed too much during surgery. Some change how well surgery (anesthesia) drugs work.  3. Call your insurance company to let them know you're having surgery. (If you don't have insurance, call [phone].)  4. Call your clinic if there's any change in your health. This includes signs of a cold or flu (sore throat, runny nose, cough, rash, fever). It also includes a scrape  or scratch near the surgery site.  5. If you have questions on the day of surgery, call your hospital or surgery center.  Eating and drinking guidelines  For your safety: Unless your surgeon tells you otherwise, follow the guidelines below.    Eat and drink as usual until 8 hours before you arrive for surgery. After that, no food or milk.    Drink clear liquids until 2 hours before you arrive. These are liquids you can see through, like water, Gatorade, and Propel Water. They also include plain black coffee and tea (no cream or milk), candy, and breath mints. You can spit out gum when you arrive.    If you drink alcohol: Stop drinking it the night before surgery.    If your care team tells you to take medicine on the morning of surgery, it's okay to take it with a sip of water.  Preventing infection  1. Shower or bathe the night before and morning of your surgery. Follow the instructions your clinic gave you. (If no instructions, use regular soap.)  2. Don't shave or clip hair near your surgery site. We'll remove the hair if needed.  3. Don't smoke or vape the morning of surgery. You may chew nicotine gum up to 2 hours before surgery. A nicotine patch is okay.  ? Note: Some surgeries require you to completely quit smoking and nicotine. Check with your surgeon.  4. Your care team will make every effort to keep you safe from infection. We will:  ? Clean our hands often with soap and water (or an alcohol-based hand rub).  ? Clean the skin at your surgery site with a special soap that kills germs.  ? Give you a special gown to keep you warm. (Cold raises the risk of infection.)  ? Wear special hair covers, masks, gowns and gloves during surgery.  ? Give antibiotic medicine, if prescribed. Not all surgeries need antibiotics.  What to bring on the day of surgery  1. Photo ID and insurance card  2. Copy of your health care directive, if you have one  3. Glasses and hearing aids (bring cases)  ? You can't wear contacts  during surgery  4. Inhaler and eye drops, if you use them (tell us about these when you arrive)  5. CPAP machine or breathing device, if you use them  6. A few personal items, if spending the night  7. If you have . . .  ? A pacemaker, ICD (cardiac defibrillator) or other implant: Bring the ID card.  ? An implanted stimulator: Bring the remote control.  ? A legal guardian: Bring a copy of the certified (court-stamped) guardianship papers.  Please remove any jewelry, including body piercings. Leave jewelry and other valuables at home.  If you're going home the day of surgery    You must have a responsible adult drive you home. They should stay with you overnight as well.    If you don't have someone to stay with you, and you aren't safe to go home alone, we may keep you overnight. Insurance often won't pay for this.  After surgery  If it's hard to control your pain or you need more pain medicine, please call your surgeon's office.  Questions?   If you have any questions for your care team, list them here: _________________________________________________________________________________________________________________________________________________________________________ ____________________________________ ____________________________________ ____________________________________

## 2023-01-20 ENCOUNTER — OFFICE VISIT (OUTPATIENT)
Dept: FAMILY MEDICINE | Facility: CLINIC | Age: 48
End: 2023-01-20
Payer: COMMERCIAL

## 2023-01-20 ENCOUNTER — INFUSION THERAPY VISIT (OUTPATIENT)
Dept: INFUSION THERAPY | Facility: CLINIC | Age: 48
End: 2023-01-20
Attending: FAMILY MEDICINE
Payer: COMMERCIAL

## 2023-01-20 VITALS
RESPIRATION RATE: 18 BRPM | SYSTOLIC BLOOD PRESSURE: 144 MMHG | TEMPERATURE: 98.3 F | HEART RATE: 95 BPM | OXYGEN SATURATION: 100 % | DIASTOLIC BLOOD PRESSURE: 85 MMHG

## 2023-01-20 VITALS
DIASTOLIC BLOOD PRESSURE: 78 MMHG | SYSTOLIC BLOOD PRESSURE: 117 MMHG | HEIGHT: 62 IN | OXYGEN SATURATION: 99 % | RESPIRATION RATE: 16 BRPM | WEIGHT: 185 LBS | BODY MASS INDEX: 34.04 KG/M2 | TEMPERATURE: 96.5 F | HEART RATE: 74 BPM

## 2023-01-20 DIAGNOSIS — D62 ANEMIA DUE TO BLOOD LOSS, ACUTE: Primary | ICD-10-CM

## 2023-01-20 DIAGNOSIS — M79.10 MUSCLE PAIN: ICD-10-CM

## 2023-01-20 DIAGNOSIS — E04.9 THYROID ENLARGED: ICD-10-CM

## 2023-01-20 DIAGNOSIS — Z12.11 COLON CANCER SCREENING: ICD-10-CM

## 2023-01-20 DIAGNOSIS — Z00.00 ROUTINE GENERAL MEDICAL EXAMINATION AT A HEALTH CARE FACILITY: Primary | ICD-10-CM

## 2023-01-20 DIAGNOSIS — R53.83 FATIGUE, UNSPECIFIED TYPE: ICD-10-CM

## 2023-01-20 LAB
ALBUMIN SERPL-MCNC: 3.7 G/DL (ref 3.4–5)
ALP SERPL-CCNC: 91 U/L (ref 40–150)
ALT SERPL W P-5'-P-CCNC: 28 U/L (ref 0–50)
ANION GAP SERPL CALCULATED.3IONS-SCNC: 6 MMOL/L (ref 3–14)
AST SERPL W P-5'-P-CCNC: 22 U/L (ref 0–45)
BASOPHILS # BLD AUTO: 0 10E3/UL (ref 0–0.2)
BASOPHILS NFR BLD AUTO: 1 %
BILIRUB SERPL-MCNC: 0.3 MG/DL (ref 0.2–1.3)
BUN SERPL-MCNC: 11 MG/DL (ref 7–30)
CALCIUM SERPL-MCNC: 9.1 MG/DL (ref 8.5–10.1)
CHLORIDE BLD-SCNC: 110 MMOL/L (ref 94–109)
CHOLEST SERPL-MCNC: 229 MG/DL
CO2 SERPL-SCNC: 25 MMOL/L (ref 20–32)
CREAT SERPL-MCNC: 0.56 MG/DL (ref 0.52–1.04)
EOSINOPHIL # BLD AUTO: 0.2 10E3/UL (ref 0–0.7)
EOSINOPHIL NFR BLD AUTO: 5 %
ERYTHROCYTE [DISTWIDTH] IN BLOOD BY AUTOMATED COUNT: 13.8 % (ref 10–15)
FASTING STATUS PATIENT QL REPORTED: NO
GFR SERPL CREATININE-BSD FRML MDRD: >90 ML/MIN/1.73M2
GLUCOSE BLD-MCNC: 101 MG/DL (ref 70–99)
HBA1C MFR BLD: 5.6 % (ref 0–5.6)
HCT VFR BLD AUTO: 41.9 % (ref 35–47)
HDLC SERPL-MCNC: 64 MG/DL
HGB BLD-MCNC: 13.6 G/DL (ref 11.7–15.7)
IMM GRANULOCYTES # BLD: 0 10E3/UL
IMM GRANULOCYTES NFR BLD: 0 %
LDLC SERPL CALC-MCNC: 145 MG/DL
LYMPHOCYTES # BLD AUTO: 2.6 10E3/UL (ref 0.8–5.3)
LYMPHOCYTES NFR BLD AUTO: 52 %
MCH RBC QN AUTO: 29.1 PG (ref 26.5–33)
MCHC RBC AUTO-ENTMCNC: 32.5 G/DL (ref 31.5–36.5)
MCV RBC AUTO: 90 FL (ref 78–100)
MONOCYTES # BLD AUTO: 0.5 10E3/UL (ref 0–1.3)
MONOCYTES NFR BLD AUTO: 10 %
NEUTROPHILS # BLD AUTO: 1.6 10E3/UL (ref 1.6–8.3)
NEUTROPHILS NFR BLD AUTO: 32 %
NONHDLC SERPL-MCNC: 165 MG/DL
NRBC # BLD AUTO: 0 10E3/UL
NRBC BLD AUTO-RTO: 0 /100
PLATELET # BLD AUTO: 306 10E3/UL (ref 150–450)
POTASSIUM BLD-SCNC: 3.9 MMOL/L (ref 3.4–5.3)
PROT SERPL-MCNC: 7.7 G/DL (ref 6.8–8.8)
RBC # BLD AUTO: 4.68 10E6/UL (ref 3.8–5.2)
SODIUM SERPL-SCNC: 141 MMOL/L (ref 133–144)
T4 FREE SERPL-MCNC: 0.93 NG/DL (ref 0.76–1.46)
TRIGL SERPL-MCNC: 101 MG/DL
TSH SERPL DL<=0.005 MIU/L-ACNC: 5.17 MU/L (ref 0.4–4)
WBC # BLD AUTO: 4.9 10E3/UL (ref 4–11)

## 2023-01-20 PROCEDURE — 99214 OFFICE O/P EST MOD 30 MIN: CPT | Mod: 25 | Performed by: FAMILY MEDICINE

## 2023-01-20 PROCEDURE — 83036 HEMOGLOBIN GLYCOSYLATED A1C: CPT | Performed by: FAMILY MEDICINE

## 2023-01-20 PROCEDURE — 80050 GENERAL HEALTH PANEL: CPT | Performed by: FAMILY MEDICINE

## 2023-01-20 PROCEDURE — 99396 PREV VISIT EST AGE 40-64: CPT | Performed by: FAMILY MEDICINE

## 2023-01-20 PROCEDURE — 36415 COLL VENOUS BLD VENIPUNCTURE: CPT | Performed by: FAMILY MEDICINE

## 2023-01-20 PROCEDURE — 258N000003 HC RX IP 258 OP 636: Performed by: FAMILY MEDICINE

## 2023-01-20 PROCEDURE — 96365 THER/PROPH/DIAG IV INF INIT: CPT

## 2023-01-20 PROCEDURE — 84439 ASSAY OF FREE THYROXINE: CPT | Performed by: FAMILY MEDICINE

## 2023-01-20 PROCEDURE — 80061 LIPID PANEL: CPT | Performed by: FAMILY MEDICINE

## 2023-01-20 PROCEDURE — 250N000011 HC RX IP 250 OP 636: Performed by: FAMILY MEDICINE

## 2023-01-20 RX ORDER — DIPHENHYDRAMINE HYDROCHLORIDE 50 MG/ML
50 INJECTION INTRAMUSCULAR; INTRAVENOUS
Status: CANCELLED
Start: 2023-01-22

## 2023-01-20 RX ORDER — ACETAMINOPHEN 500 MG
500-1000 TABLET ORAL EVERY 6 HOURS PRN
Qty: 90 TABLET | Refills: 0 | Status: SHIPPED | OUTPATIENT
Start: 2023-01-20 | End: 2023-06-06

## 2023-01-20 RX ORDER — ALBUTEROL SULFATE 0.83 MG/ML
2.5 SOLUTION RESPIRATORY (INHALATION)
Status: CANCELLED | OUTPATIENT
Start: 2023-01-22

## 2023-01-20 RX ORDER — ALBUTEROL SULFATE 90 UG/1
1-2 AEROSOL, METERED RESPIRATORY (INHALATION)
Status: CANCELLED
Start: 2023-01-22

## 2023-01-20 RX ORDER — MEPERIDINE HYDROCHLORIDE 25 MG/ML
25 INJECTION INTRAMUSCULAR; INTRAVENOUS; SUBCUTANEOUS EVERY 30 MIN PRN
Status: CANCELLED | OUTPATIENT
Start: 2023-01-22

## 2023-01-20 RX ORDER — METHYLPREDNISOLONE SODIUM SUCCINATE 125 MG/2ML
125 INJECTION, POWDER, LYOPHILIZED, FOR SOLUTION INTRAMUSCULAR; INTRAVENOUS
Status: CANCELLED
Start: 2023-01-22

## 2023-01-20 RX ORDER — HEPARIN SODIUM,PORCINE 10 UNIT/ML
5 VIAL (ML) INTRAVENOUS
Status: CANCELLED | OUTPATIENT
Start: 2023-01-22

## 2023-01-20 RX ORDER — EPINEPHRINE 1 MG/ML
0.3 INJECTION, SOLUTION, CONCENTRATE INTRAVENOUS EVERY 5 MIN PRN
Status: CANCELLED | OUTPATIENT
Start: 2023-01-22

## 2023-01-20 RX ORDER — HEPARIN SODIUM (PORCINE) LOCK FLUSH IV SOLN 100 UNIT/ML 100 UNIT/ML
5 SOLUTION INTRAVENOUS
Status: CANCELLED | OUTPATIENT
Start: 2023-01-22

## 2023-01-20 RX ADMIN — IRON SUCROSE 300 MG: 20 INJECTION, SOLUTION INTRAVENOUS at 12:09

## 2023-01-20 RX ADMIN — SODIUM CHLORIDE 250 ML: 9 INJECTION, SOLUTION INTRAVENOUS at 13:40

## 2023-01-20 ASSESSMENT — ENCOUNTER SYMPTOMS
JOINT SWELLING: 1
PALPITATIONS: 1
ARTHRALGIAS: 1
NERVOUS/ANXIOUS: 1
SORE THROAT: 0
COUGH: 1
SHORTNESS OF BREATH: 1
HEARTBURN: 1
MYALGIAS: 1
FEVER: 1

## 2023-01-20 ASSESSMENT — PATIENT HEALTH QUESTIONNAIRE - PHQ9
SUM OF ALL RESPONSES TO PHQ QUESTIONS 1-9: 10
10. IF YOU CHECKED OFF ANY PROBLEMS, HOW DIFFICULT HAVE THESE PROBLEMS MADE IT FOR YOU TO DO YOUR WORK, TAKE CARE OF THINGS AT HOME, OR GET ALONG WITH OTHER PEOPLE: SOMEWHAT DIFFICULT
SUM OF ALL RESPONSES TO PHQ QUESTIONS 1-9: 10

## 2023-01-20 ASSESSMENT — PAIN SCALES - GENERAL: PAINLEVEL: NO PAIN (0)

## 2023-01-20 NOTE — PROGRESS NOTES
Infusion Nursing Note:  Anat Su presents today for Venofer 300, 1/3.    Patient seen by provider today: No   present during visit today: Not Applicable.    Note: Patient reports no overt Sx of Anemia. BP elevated at end of infusion 153/69, recheck 144/85.    Intravenous Access:  Peripheral IV placed left lower forearm.    Treatment Conditions:  Not Applicable.    Post Infusion Assessment:  Patient tolerated infusion without incident.  Patient has had iron infusions in the past with no history of reaction. Observation not indicated.   No evidence of extravasations.  Access discontinued per protocol.     Discharge Plan:   Patient declined prescription refills.  Discharge instructions reviewed with: Patient.  Patient discharged in stable condition accompanied by: self.  Departure Mode: Ambulatory.      Cassandra Beckett RN

## 2023-01-20 NOTE — PATIENT INSTRUCTIONS
- Set up Thyroid US     - colonoscopy     - Mammogram     - labs today     - Return in 2 weeks for follow , discuss labs and  PAP smear     -         Preventive Health Recommendations  Female Ages 40 to 49    Yearly exam:   See your health care provider every year in order to  Review health changes.   Discuss preventive care.    Review your medicines if your doctor prescribed any.    Get a Pap test every three years (unless you have an abnormal result and your provider advises testing more often).    If you get Pap tests with HPV test, you only need to test every 5 years, unless you have an abnormal result. You do not need a Pap test if your uterus was removed (hysterectomy) and you have not had cancer.    You should be tested each year for STDs (sexually transmitted diseases), if you're at risk.   Ask your doctor if you should have a mammogram.    Have a colonoscopy (test for colon cancer) if someone in your family has had colon cancer or polyps before age 50.     Have a cholesterol test every 5 years.     Have a diabetes test (fasting glucose) after age 45. If you are at risk for diabetes, you should have this test every 3 years.    Shots: Get a flu shot each year. Get a tetanus shot every 10 years.     Nutrition:   Eat at least 5 servings of fruits and vegetables each day.  Eat whole-grain bread, whole-wheat pasta and brown rice instead of white grains and rice.  Get adequate Calcium and Vitamin D.      Lifestyle  Exercise at least 150 minutes a week (an average of 30 minutes a day, 5 days a week). This will help you control your weight and prevent disease.  Limit alcohol to one drink per day.  No smoking.   Wear sunscreen to prevent skin cancer.  See your dentist every six months for an exam and cleaning.

## 2023-01-20 NOTE — PROGRESS NOTES
SUBJECTIVE:   CC: Berkley is an 48 year old who presents for preventive health visit.          Patient has been advised of split billing requirements and indicates understanding: Yes  Healthy Habits:     Getting at least 3 servings of Calcium per day:  NO    Bi-annual eye exam:  NO    Dental care twice a year:  NO    Sleep apnea or symptoms of sleep apnea:  None    Diet:  Regular (no restrictions) and Breakfast skipped    Frequency of exercise:  None    Taking medications regularly:  Yes    Medication side effects:  None    PHQ-2 Total Score: 0    Additional concerns today:  Yes    Preventive -    Immunization History   Administered Date(s) Administered     Mantoux Tuberculin Skin Test 08/24/2005     TD (ADULT, 7+) 07/08/1998     Td (Adult), Adsorbed 07/08/1998     Tdap (Adacel,Boostrix) 10/21/2018       -Mammogram: referred     -Contraception: not sexually active     -PAP: declined today due to her period she will schedule in 2-3 weeks     Lab Results   Component Value Date    PAP NIL 07/17/2012    PAP NIL 01/17/2006       - Colon CA screen: Agreeable to Colonoscopy , referred today       -lipids screen:ordered     Diabetes screen: ordered     Breast Cancer: Patient would like a mammogram done- already ordered on 10/12/22 by another provider. Patient will call to schedule.       Today's PHQ-2 Score:   PHQ-2 ( 1999 Pfizer) 1/20/2023   Q1: Little interest or pleasure in doing things 0   Q2: Feeling down, depressed or hopeless 0   PHQ-2 Score 0   Q1: Little interest or pleasure in doing things Not at all   Q2: Feeling down, depressed or hopeless Not at all   PHQ-2 Score 0       Have you ever done Advance Care Planning? (For example, a Health Directive, POLST, or a discussion with a medical provider or your loved ones about your wishes): No, advance care planning information given to patient to review.  Patient plans to discuss their wishes with loved ones or provider.    SH:    Marital status: single   Kids:  2  Employment: group home   Exercise: no   Tobacco: yes 1/2 per day   Etoh: no  Quit 2017   Recreational drugs: no   Caffeine: coffee     Social History     Tobacco Use     Smoking status: Every Day     Packs/day: 0.50     Types: Cigarettes     Smokeless tobacco: Never   Substance Use Topics     Alcohol use: No     If you drink alcohol do you typically have >3 drinks per day or >7 drinks per week? No    Alcohol Use 2023   Prescreen: >3 drinks/day or >7 drinks/week? No   Prescreen: >3 drinks/day or >7 drinks/week? -   No flowsheet data found.    Reviewed orders with patient.  Reviewed health maintenance and updated orders accordingly - Yes  Patient Active Problem List   Diagnosis     Depressive disorder, not elsewhere classified     Pregnancy with history of      Other ill-defined disorder of eye     Acute and subacute iridocyclitis, unspecified     Anxiety state     Diffuse cystic mastopathy     Health Care Home     Cataract     Fibroadenoma of breast     PTSD (post-traumatic stress disorder)     No past surgical history on file.    Social History     Tobacco Use     Smoking status: Every Day     Packs/day: 0.50     Types: Cigarettes     Smokeless tobacco: Never   Substance Use Topics     Alcohol use: No     History reviewed. No pertinent family history.        Breast Cancer Screening:  Any new diagnosis of family breast, ovarian, or bowel cancer? No    FHS-7: No flowsheet data found.    Mammogram ordered in October by another provider. Patient will call to schedule. Provided patient with phone number to call to schedule.   Pertinent mammograms are reviewed under the imaging tab.    History of abnormal Pap smear: No. Patient declines Pap smear today. She will schedule follow up visit in 2 weeks to have Pap smear done and discuss labs results  PAP / HPV 2012   PAP (Historical) NIL NIL     Reviewed and updated as needed this visit by clinical staff   Tobacco  Allergies  Meds      Soc Hx  "       Reviewed and updated as needed this visit by Provider   Tobacco  Allergies       Soc Hx       Past Medical History:   Diagnosis Date     Asthma      Shoulder dislocations       No past surgical history on file.  OB History    Para Term  AB Living   9 1 0 0 8 1   SAB IAB Ectopic Multiple Live Births   0 8 0 0 1      # Outcome Date GA Lbr Samuel/2nd Weight Sex Delivery Anes PTL Lv   9 Para 05   3.487 kg (7 lb 11 oz) F CS   HALLE      Name: Elza   8 IAB            7 IAB            6 IAB            5 IAB            4 IAB            3 IAB            2 IAB            1 IAB              Patient is a 48 year old female with a past medical history of anxiety/depression, PTSD, and cataract who presents for preventative exam and has concerns of fatigue for the past few months. She also reports chronic generalized pain from the trauma she endured [place] [facility] [place].     Review of Systems   Constitutional: Positive for fever.   HENT: Positive for congestion. Negative for sore throat.    Eyes: Positive for visual disturbance.   Respiratory: Positive for cough and shortness of breath.    Cardiovascular: Positive for chest pain and palpitations.   Gastrointestinal: Positive for heartburn.   Musculoskeletal: Positive for arthralgias, joint swelling and myalgias.   Psychiatric/Behavioral: Positive for mood changes. The patient is nervous/anxious.         OBJECTIVE:   /78 (BP Location: Left arm, Patient Position: Sitting, Cuff Size: Adult Large)   Pulse 74   Temp (!) 96.5  F (35.8  C) (Tympanic)   Resp 16   Ht 1.564 m (5' 1.57\")   Wt 83.9 kg (185 lb)   LMP 2023 (Exact Date)   SpO2 99%   Breastfeeding No   BMI 34.31 kg/m       Physical Exam  GENERAL: healthy, alert and no distress  EYES: Eyes grossly normal to inspection, PERRL and conjunctivae and sclerae normal  HENT: ear canals and TM's normal, nose and mouth without ulcers or lesions  NECK: thyroid enlarged. No pain " on palpation  RESP: lungs clear to auscultation - no rales, rhonchi or wheezes  BREAST: normal without masses, tenderness or nipple discharge and no palpable axillary masses or adenopathy  CV: regular rate and rhythm, normal S1 S2, no S3 or S4, no murmur, click or rub, no peripheral edema and peripheral pulses strong  ABDOMEN: soft, nontender, no hepatosplenomegaly, no masses and bowel sounds normal   (female): deferred. Cervical cancer screening will be performed at follow up visit in 2-3 weeks (02/07/2023)  MS: no gross musculoskeletal defects noted, no edema  SKIN: no suspicious lesions or rashes  NEURO: Normal strength and tone, mentation intact and speech normal  PSYCH: mentation appears normal, affect normal/bright    Labs ordered today- pending    ASSESSMENT/PLAN:   (Z00.00) Routine general medical examination at a health care facility  (primary encounter diagnosis)  Comment: Preventive care reviewed and updated.    Plan: Lipid panel reflex to direct LDL Non-fasting,         Hemoglobin A1c    (R53.83) Fatigue, unspecified type  Comment:   Patient reports feeling tired all the time    I had a long discussion with the patient about her condition , diagnosis treatment options. We discussed diffenetial diagnosis which includes thyroid issue, sleep disorder, anemia and much more       Plan: CBC with platelets and differential, TSH with         free T4 reflex, Comprehensive metabolic panel         (BMP + Alb, Alk Phos, ALT, AST, Total. Bili,         TP)    (Z12.11) Colon cancer screening    Plan: Colonoscopy Screening  Referral  (E04.9) Thyroid enlarged  Comment: noted on exam associated symptoms fatigue   Plan: US Thyroid       (M79.10) Muscle pain  Plan: acetaminophen (TYLENOL) 500 MG tablet     Patient has been advised of split billing requirements and indicates understanding: Yes      COUNSELING:  Reviewed preventive health counseling, as reflected in patient instructions       Regular exercise        "Healthy diet/nutrition       Alcohol Use       Colorectal Cancer Screening      BMI:   Estimated body mass index is 34.31 kg/m  as calculated from the following:    Height as of this encounter: 1.564 m (5' 1.57\").    Weight as of this encounter: 83.9 kg (185 lb).   Weight management plan: Discussed healthy diet and exercise guidelines      She reports that she has been smoking cigarettes. She has been smoking an average of .5 packs per day. She has never used smokeless tobacco.  Nicotine/Tobacco Cessation Plan:   Information offered: Patient not interested at this time      Sera Barboza RN DNP FNP student   The student acted as a scribe and the encounter documented above was completely performed by myself and the documentation reflects the work I have performed today.    Leobardo Perales MD  [facility] [place]    Answers for HPI/ROS submitted by the patient on 1/20/2023  If you checked off any problems, how difficult have these problems made it for you to do your work, take care of things at home, or get along with other people?: Somewhat difficult  PHQ9 TOTAL SCORE: 10      "

## 2023-01-24 NOTE — TELEPHONE ENCOUNTER
Patient called back to talk to Kathi. States she was away last week when Kathi called. Left a  for her and hasn't heard back. Kathi away from desk. Will route to Kathi.

## 2023-01-25 ENCOUNTER — INFUSION THERAPY VISIT (OUTPATIENT)
Dept: INFUSION THERAPY | Facility: CLINIC | Age: 48
End: 2023-01-25
Attending: FAMILY MEDICINE
Payer: COMMERCIAL

## 2023-01-25 VITALS
DIASTOLIC BLOOD PRESSURE: 78 MMHG | HEART RATE: 90 BPM | RESPIRATION RATE: 16 BRPM | SYSTOLIC BLOOD PRESSURE: 148 MMHG | OXYGEN SATURATION: 100 % | TEMPERATURE: 98.5 F

## 2023-01-25 DIAGNOSIS — D62 ANEMIA DUE TO BLOOD LOSS, ACUTE: Primary | ICD-10-CM

## 2023-01-25 PROCEDURE — 250N000011 HC RX IP 250 OP 636: Performed by: FAMILY MEDICINE

## 2023-01-25 PROCEDURE — 96366 THER/PROPH/DIAG IV INF ADDON: CPT

## 2023-01-25 PROCEDURE — 258N000003 HC RX IP 258 OP 636: Performed by: FAMILY MEDICINE

## 2023-01-25 PROCEDURE — 96365 THER/PROPH/DIAG IV INF INIT: CPT

## 2023-01-25 RX ORDER — METHYLPREDNISOLONE SODIUM SUCCINATE 125 MG/2ML
125 INJECTION, POWDER, LYOPHILIZED, FOR SOLUTION INTRAMUSCULAR; INTRAVENOUS
Status: CANCELLED
Start: 2023-01-26

## 2023-01-25 RX ORDER — EPINEPHRINE 1 MG/ML
0.3 INJECTION, SOLUTION, CONCENTRATE INTRAVENOUS EVERY 5 MIN PRN
Status: CANCELLED | OUTPATIENT
Start: 2023-01-26

## 2023-01-25 RX ORDER — DIPHENHYDRAMINE HYDROCHLORIDE 50 MG/ML
50 INJECTION INTRAMUSCULAR; INTRAVENOUS
Status: CANCELLED
Start: 2023-01-26

## 2023-01-25 RX ORDER — ALBUTEROL SULFATE 0.83 MG/ML
2.5 SOLUTION RESPIRATORY (INHALATION)
Status: CANCELLED | OUTPATIENT
Start: 2023-01-26

## 2023-01-25 RX ORDER — HEPARIN SODIUM (PORCINE) LOCK FLUSH IV SOLN 100 UNIT/ML 100 UNIT/ML
5 SOLUTION INTRAVENOUS
Status: CANCELLED | OUTPATIENT
Start: 2023-01-26

## 2023-01-25 RX ORDER — ALBUTEROL SULFATE 90 UG/1
1-2 AEROSOL, METERED RESPIRATORY (INHALATION)
Status: CANCELLED
Start: 2023-01-26

## 2023-01-25 RX ORDER — HEPARIN SODIUM,PORCINE 10 UNIT/ML
5 VIAL (ML) INTRAVENOUS
Status: CANCELLED | OUTPATIENT
Start: 2023-01-26

## 2023-01-25 RX ORDER — MEPERIDINE HYDROCHLORIDE 25 MG/ML
25 INJECTION INTRAMUSCULAR; INTRAVENOUS; SUBCUTANEOUS EVERY 30 MIN PRN
Status: CANCELLED | OUTPATIENT
Start: 2023-01-26

## 2023-01-25 RX ADMIN — IRON SUCROSE 300 MG: 20 INJECTION, SOLUTION INTRAVENOUS at 12:14

## 2023-01-25 ASSESSMENT — PAIN SCALES - GENERAL: PAINLEVEL: NO PAIN (0)

## 2023-01-25 NOTE — PROGRESS NOTES
Infusion Nursing Note:  Anat Su presents today for 2nd dose of venofer 300mg.    Patient seen by provider today: No   present during visit today: Not Applicable.    Note: Patient states she has more energy.    Intravenous Access:  Peripheral IV placed.    Treatment Conditions:  Not Applicable.    Post Infusion Assessment:  Patient tolerated infusion without incident.  Blood return noted pre and post infusion.  No evidence of extravasations.  Access discontinued per protocol.     Discharge Plan:   Patient discharged in stable condition accompanied by: self.  Departure Mode: Ambulatory.  Will return to clinic Monday for final dose.    Jackie Grimes RN

## 2023-01-25 NOTE — TELEPHONE ENCOUNTER
Called patient, again received voicemail. Left message to call back if patient would like to go over procedure instructions via phone (directions also sent and read by patient through [employer]).    Kathi Barrett  Clinical Services Assistant

## 2023-01-30 NOTE — Clinical Note
MADY.  I'm waiting to see what her ferritin is before ordering more IV iron. She is somewhat reluctantly agreeable to Lupron (worried about side effects) but is scheduled.  Amanda Morning Community Meeting Topics    Iris García attended the morning community meeting on 1/30/23. Topics discussed today     [x] Introduction  Day of the week and date  Mask distribution  Current mask requirements  [x]Teams  Explanation of  Green and Blue team criteria  Nurses assigned to each team for today  Explanation about green and blue paper  Date  Patient's Name  Patient's Nurse  Goals  [x] Visitation  Announce the visiting hours for the day  Announce which team is allowed to have visitors for the day  Review any updated Covid 19 requirements for visitors during visitation  Vaccine Card or negative Covid test within 48 hours of visit  State Identification  Patients are reminded to alert the  at least 1 hour before visitation   [x] Unit Orientation  Coffee use  Phone location and etiquette  Shower locations  [employer] location and process  Common area expectations  Staff rounds expectation  [x] Meals   Educate patient to the menu  The patient is encouraged to fill out the menu to get preferences at mealtime  The patient is educated that if they do not fill out the menu, they will get the standard tray  The coffee pot is decaf, patient encouraged to order regular coffee from menu.   Educate patient to the meal process  Patient encouraged to eat snacks provided twice daily  Snacks may stay in patient room     [x] Discharge Process  Discharge expectations  Fill out the survey after discharge   [x] Hygiene  Daily showers encouraged  Showers availability discussed   Daily dressing encouraged  Discussed wearing street clothing  Education provided on where to place linens and clothing  Linens in the hamper  personal clothing does not go into the linen hamper  [x] Group   Patient encouraged to attend group provided  Time of Group Meetings discussed  Gentle reminder that attendance is a Physician order  [x] Movement  Chair exercises completed  Stretching completed  Notes:  Goal - \"To attend the groups\" Electronically signed by Panchito Resendiz, [address] on 1/30/2023 at 9:58 AM

## 2023-02-02 ENCOUNTER — INFUSION THERAPY VISIT (OUTPATIENT)
Dept: INFUSION THERAPY | Facility: CLINIC | Age: 48
End: 2023-02-02
Attending: FAMILY MEDICINE
Payer: COMMERCIAL

## 2023-02-02 VITALS
SYSTOLIC BLOOD PRESSURE: 137 MMHG | DIASTOLIC BLOOD PRESSURE: 83 MMHG | RESPIRATION RATE: 18 BRPM | TEMPERATURE: 98.7 F | HEART RATE: 89 BPM | OXYGEN SATURATION: 100 %

## 2023-02-02 DIAGNOSIS — D62 ANEMIA DUE TO BLOOD LOSS, ACUTE: Primary | ICD-10-CM

## 2023-02-02 PROCEDURE — 250N000011 HC RX IP 250 OP 636: Performed by: FAMILY MEDICINE

## 2023-02-02 PROCEDURE — 258N000003 HC RX IP 258 OP 636: Performed by: FAMILY MEDICINE

## 2023-02-02 PROCEDURE — 96366 THER/PROPH/DIAG IV INF ADDON: CPT

## 2023-02-02 PROCEDURE — 96365 THER/PROPH/DIAG IV INF INIT: CPT

## 2023-02-02 RX ORDER — ALBUTEROL SULFATE 0.83 MG/ML
2.5 SOLUTION RESPIRATORY (INHALATION)
Status: CANCELLED | OUTPATIENT
Start: 2023-02-02

## 2023-02-02 RX ORDER — MEPERIDINE HYDROCHLORIDE 25 MG/ML
25 INJECTION INTRAMUSCULAR; INTRAVENOUS; SUBCUTANEOUS EVERY 30 MIN PRN
Status: CANCELLED | OUTPATIENT
Start: 2023-02-02

## 2023-02-02 RX ORDER — DIPHENHYDRAMINE HYDROCHLORIDE 50 MG/ML
50 INJECTION INTRAMUSCULAR; INTRAVENOUS
Status: CANCELLED
Start: 2023-02-02

## 2023-02-02 RX ORDER — ALBUTEROL SULFATE 90 UG/1
1-2 AEROSOL, METERED RESPIRATORY (INHALATION)
Status: CANCELLED
Start: 2023-02-02

## 2023-02-02 RX ORDER — METHYLPREDNISOLONE SODIUM SUCCINATE 125 MG/2ML
125 INJECTION, POWDER, LYOPHILIZED, FOR SOLUTION INTRAMUSCULAR; INTRAVENOUS
Status: CANCELLED
Start: 2023-02-02

## 2023-02-02 RX ORDER — HEPARIN SODIUM,PORCINE 10 UNIT/ML
5 VIAL (ML) INTRAVENOUS
Status: CANCELLED | OUTPATIENT
Start: 2023-02-02

## 2023-02-02 RX ORDER — EPINEPHRINE 1 MG/ML
0.3 INJECTION, SOLUTION, CONCENTRATE INTRAVENOUS EVERY 5 MIN PRN
Status: CANCELLED | OUTPATIENT
Start: 2023-02-02

## 2023-02-02 RX ORDER — HEPARIN SODIUM (PORCINE) LOCK FLUSH IV SOLN 100 UNIT/ML 100 UNIT/ML
5 SOLUTION INTRAVENOUS
Status: CANCELLED | OUTPATIENT
Start: 2023-02-02

## 2023-02-02 RX ADMIN — IRON SUCROSE 300 MG: 20 INJECTION, SOLUTION INTRAVENOUS at 11:51

## 2023-02-02 RX ADMIN — SODIUM CHLORIDE 250 ML: 9 INJECTION, SOLUTION INTRAVENOUS at 13:21

## 2023-02-02 NOTE — PROGRESS NOTES
Infusion Nursing Note:  Anat Su presents today for Venofer 300 mg, 3/3.    Patient seen by provider today: No   present during visit today: Not Applicable.    Note: Patient reports feeling more energetic and sleeping better. Therapy plan completed and discontinued as this is last ordered infusion.     Intravenous Access:  Peripheral IV placed right lower forearm.    Treatment Conditions:  Not Applicable.    Post Infusion Assessment:  Patient tolerated infusion without incident.  Site patent and intact, free from redness, edema or discomfort.  No evidence of extravasations.  Access discontinued per protocol.     Discharge Plan:   Discharge instructions reviewed with: Patient.  Patient and/or family verbalized understanding of discharge instructions and all questions answered.  Patient discharged in stable condition accompanied by: self.  Departure Mode: Ambulatory.      Cassandra Beckett RN

## 2023-02-09 ENCOUNTER — ANCILLARY PROCEDURE (OUTPATIENT)
Dept: ULTRASOUND IMAGING | Facility: CLINIC | Age: 48
End: 2023-02-09
Attending: FAMILY MEDICINE
Payer: COMMERCIAL

## 2023-02-09 DIAGNOSIS — E04.9 THYROID ENLARGED: ICD-10-CM

## 2023-02-09 PROCEDURE — 76536 US EXAM OF HEAD AND NECK: CPT | Mod: TC | Performed by: RADIOLOGY

## 2023-02-13 ENCOUNTER — TELEPHONE (OUTPATIENT)
Dept: FAMILY MEDICINE | Facility: CLINIC | Age: 48
End: 2023-02-13
Payer: COMMERCIAL

## 2023-02-13 DIAGNOSIS — D64.9 SEVERE ANEMIA: Primary | ICD-10-CM

## 2023-02-13 NOTE — CONFIDENTIAL NOTE
Patient called to the clinic requesting lab order.        [facility] phone call message - order or referral request for patient:      Order or referral being requested: Lab order        Additional Comments: The patient is requesting lab orders to check iron. The patient period started over the weekend and the patient is bleeding heavily.     OK to leave a message on voice mail? Yes     Primary language: English      needed? No     Call taken on February 13, 2023 at 1:30 PM by Stacey Johnson RN

## 2023-02-13 NOTE — TELEPHONE ENCOUNTER
[facility] phone call message - order or referral request for patient:     Order or referral being requested: Lab order      Additional Comments: The patient is requesting lab orders to check iron. The patient period started over the weekend and the patient is bleeding heavily.    OK to leave a message on voice mail? Yes    Primary language: English      needed? No    Call taken on February 13, 2023 at 1:30 PM by Stacey Vee

## 2023-02-14 ENCOUNTER — TELEPHONE (OUTPATIENT)
Dept: FAMILY MEDICINE | Facility: CLINIC | Age: 48
End: 2023-02-14

## 2023-02-14 ENCOUNTER — TELEPHONE (OUTPATIENT)
Dept: FAMILY MEDICINE | Facility: CLINIC | Age: 48
End: 2023-02-14
Payer: COMMERCIAL

## 2023-02-14 NOTE — CONFIDENTIAL NOTE
"RN spoke to the patient and relay the message below.patient \"stated that I have told the doctor that I do not need blood transfusion only iron and I want to see the doctor ASAP. RN informed the only day is 2/28 /2023 patient decline and said I will have a surgery soon I need to see the doctor or tel the doctor to call me .          I ordered a hemoglobin check but also recommend she have an office visit to review the results and decide next steps.      Pt just completed IV iron x3, likely is too soon to check to see full result of IV iron. Please re-iterate to her that if her hemoglobin is less than 7, I strongly recommend a blood transfusion (which she has declined in the past).      Amanda Abernathy, DO    Socorro Johnson RN    "

## 2023-02-14 NOTE — TELEPHONE ENCOUNTER
Jacklyn Quintanilla  Looks like her hemoglobin is high enough, so she doesn't need transfusion. She will need another hemoglobin before her surgery  and they might postpone if not where they want it to be.     Mayte Perez MD  Covering for Dr. Abernathy

## 2023-02-14 NOTE — CONFIDENTIAL NOTE
"RN spoke to the patient and relay the message below informed the order for the lab test, stated \"she will call  an make an appointment\".            Jacklyn Quintanilla  Looks like her hemoglobin is high enough, so she doesn't need transfusion. She will need another hemoglobin before her surgery  and they might postpone if not where they want it to be.      Mayte Perez MD  Covering for Dr. Josemanuel Johnson, RN      "

## 2023-02-14 NOTE — TELEPHONE ENCOUNTER
I ordered a hemoglobin check but also recommend she have an office visit to review the results and decide next steps.     Pt just completed IV iron x3, likely is too soon to check to see full result of IV iron. Please re-iterate to her that if her hemoglobin is less than 7, I strongly recommend a blood transfusion (which she has declined in the past).     Amanda Abernathy, DO

## 2023-02-24 ENCOUNTER — TELEPHONE (OUTPATIENT)
Dept: FAMILY MEDICINE | Facility: CLINIC | Age: 48
End: 2023-02-24

## 2023-02-24 ENCOUNTER — OFFICE VISIT (OUTPATIENT)
Dept: FAMILY MEDICINE | Facility: CLINIC | Age: 48
End: 2023-02-24
Payer: COMMERCIAL

## 2023-02-24 VITALS
SYSTOLIC BLOOD PRESSURE: 118 MMHG | OXYGEN SATURATION: 99 % | WEIGHT: 205.8 LBS | DIASTOLIC BLOOD PRESSURE: 77 MMHG | BODY MASS INDEX: 32.3 KG/M2 | RESPIRATION RATE: 16 BRPM | HEART RATE: 105 BPM | TEMPERATURE: 99.5 F | HEIGHT: 67 IN

## 2023-02-24 DIAGNOSIS — D64.9 SEVERE ANEMIA: ICD-10-CM

## 2023-02-24 DIAGNOSIS — D62 ANEMIA DUE TO BLOOD LOSS, ACUTE: ICD-10-CM

## 2023-02-24 DIAGNOSIS — D62 ANEMIA DUE TO BLOOD LOSS, ACUTE: Primary | ICD-10-CM

## 2023-02-24 DIAGNOSIS — D25.1 INTRAMURAL LEIOMYOMA OF UTERUS: Primary | ICD-10-CM

## 2023-02-24 LAB
ERYTHROCYTE [DISTWIDTH] IN BLOOD BY AUTOMATED COUNT: 24.7 % (ref 10–15)
HCT VFR BLD AUTO: 25.7 % (ref 35–47)
HGB BLD-MCNC: 6.7 G/DL (ref 11.7–15.7)
MCH RBC QN AUTO: 20.9 PG (ref 26.5–33)
MCHC RBC AUTO-ENTMCNC: 26.1 G/DL (ref 31.5–36.5)
MCV RBC AUTO: 80 FL (ref 78–100)
PLATELET # BLD AUTO: 438 10E3/UL (ref 150–450)
RBC # BLD AUTO: 3.2 10E6/UL (ref 3.8–5.2)
TSH SERPL DL<=0.005 MIU/L-ACNC: 1.55 UIU/ML (ref 0.3–4.2)
WBC # BLD AUTO: 5.2 10E3/UL (ref 4–11)

## 2023-02-24 PROCEDURE — 36415 COLL VENOUS BLD VENIPUNCTURE: CPT | Performed by: FAMILY MEDICINE

## 2023-02-24 PROCEDURE — 84443 ASSAY THYROID STIM HORMONE: CPT | Performed by: FAMILY MEDICINE

## 2023-02-24 PROCEDURE — 85027 COMPLETE CBC AUTOMATED: CPT | Performed by: FAMILY MEDICINE

## 2023-02-24 PROCEDURE — 99213 OFFICE O/P EST LOW 20 MIN: CPT | Performed by: FAMILY MEDICINE

## 2023-02-24 RX ORDER — FERROUS SULFATE 325(65) MG
325 TABLET ORAL
COMMUNITY

## 2023-02-24 RX ORDER — ACETAMINOPHEN 160 MG
TABLET,DISINTEGRATING ORAL
COMMUNITY
Start: 2023-02-07 | End: 2023-06-22

## 2023-02-24 NOTE — TELEPHONE ENCOUNTER
Per Dr. Camargo we are unable to do another iron infusion without waiting for labs in 1-2 weeks.  Patient informed.  Advised that we will place orders for her to have labs drawn.  Number given to make appointment at [facility].    Advised patient that if she feels faint, SOB or dizzy to be taken to ER.  patient agrees to plan.  JORDY OLIVARES RN

## 2023-02-24 NOTE — TELEPHONE ENCOUNTER
Hgb is 6.7 called by Nany at [facility] lab.  Reported in person to Dr. Camargo.  JORDY OLIVARES RN

## 2023-02-24 NOTE — TELEPHONE ENCOUNTER
[facility] phone call message- general phone call:    Reason for call: Nany with the [facility] is requested a call back from a nurse to provide a clinical report.     Return call needed: Yes    OK to leave a message on voice mail? Yes    Primary language: English      needed? No    Call taken on February 24, 2023 at 3:19 PM by Josephine Olivo

## 2023-02-24 NOTE — PROGRESS NOTES
"  Assessment & Plan     Intramural leiomyoma of uterus  Ordered per OB/gyne request  - US Pelvic Complete w Transvaginal; Future    Anemia due to blood loss, acute  Ordered per OB/gyne request  - TSH with free T4 reflex; Future  - TSH with free T4 reflex    Severe anemia  Pt has declined blood transfusion in the past  Has completed iron infusion x6, most recent on 2/2  Will repeat Hgb now, has bleed since last infusion but not currently, discussed that Hgb may continue to rise after today. Would check iron labs before further iron transfusion  Pt will likely need pre-operative blood transfusion, unlikely to reach pre-op Hgb goal with iron only, discussed this today. Encouraged patient to discuss with ob/gyne as well.  - CBC with platelets      No follow-ups on file.    Kelsey Camargo DO  [facility] SUSAN Coppola is a 48 year old presenting for the following health issues:  Blood Draw (Pt request blood draw x Thyroid . Pt also request Pelvic US)      HPI     Pt with known large fibroid uterus with severe anemia due to blood loss  Hgb 4.6 -> iron inf x3 -> 7.2 -> iron infx3  Was planning to do myomectomy at , but due to insurance will be going to [facility]. New provider requested a pelvic ultrasound and TSH before visit.  LMP ~2/18, lasted 5 days, 2 days heavy but overall much less than prior    Review of Systems   Constitutional, HEENT, cardiovascular, pulmonary, gi and gu systems are negative, except as otherwise noted.      Objective    /77 (BP Location: Left arm, Patient Position: Sitting, Cuff Size: Adult Large)   Pulse 105   Temp 99.5  F (37.5  C) (Oral)   Resp 16   Ht 1.695 m (5' 6.73\")   Wt 93.4 kg (205 lb 12.8 oz)   LMP 02/12/2023 (Exact Date)   SpO2 99%   BMI 32.49 kg/m    Body mass index is 32.49 kg/m .  Physical Exam   GENERAL: healthy, alert and no distress  RESP: lungs clear to auscultation - no rales, rhonchi or wheezes  CV: regular rate and " rhythm, normal S1 S2, no S3 or S4, no murmur, click or rub, no peripheral edema and peripheral pulses strong  MS: no gross musculoskeletal defects noted, no edema

## 2023-02-24 NOTE — TELEPHONE ENCOUNTER
Per Dr. Camargo patient has had an iron infusion recently and declines blood transfusion so we anticipate that the hgb will continue to rise.  Patient will need to recheck hgb and iron studies in 1-2 weeks.  Patient needs to find out from [facility] what the cutoff is for surgery.     Patient states she would prefer to have the iron infusion because her last infusion was the end of January and she has since had a 2 week period.  Feels that her hgb will not recover without infusion.  Please advise.   JORDY OLIVARES RN

## 2023-02-27 ENCOUNTER — TELEPHONE (OUTPATIENT)
Dept: FAMILY MEDICINE | Facility: CLINIC | Age: 48
End: 2023-02-27

## 2023-03-01 ENCOUNTER — TELEPHONE (OUTPATIENT)
Dept: OBGYN | Facility: CLINIC | Age: 48
End: 2023-03-01
Payer: COMMERCIAL

## 2023-03-01 NOTE — TELEPHONE ENCOUNTER
[facility] Call Center    Phone Message    May a detailed message be left on voicemail: yes     Reason for Call: Other: Anat is calling asking about getting pre ops done for both 3/21 and 4/21 surgeries and needs to know if she needs a per op specifically for the 3/21 surgery. She is also stating that she read the paperwork that was sent to her and and that she has questions on that also. Please review and call back, thanks.     Action Taken: Message routed to:  Other: whs    Travel Screening: Not Applicable

## 2023-03-02 NOTE — TELEPHONE ENCOUNTER
Left message for patient regarding need for a pre-op exam before BOTH procedures on 3/21 and 4/21 (will need 2 pre-op exams). Left voicemail to call back with other questions.    Kathi Barrett  Clinical Services Assistant

## 2023-03-07 ENCOUNTER — TELEPHONE (OUTPATIENT)
Dept: OBGYN | Facility: CLINIC | Age: 48
End: 2023-03-07
Payer: COMMERCIAL

## 2023-03-07 NOTE — TELEPHONE ENCOUNTER
Patient left voicemail on surgery scheduling line requesting to cancel surgery on 3/21 and 4/21/23. Returned call to patient to confirm that she wishes to cancel, no answer so voicemail was left.    Kathi Barrett  Clinical Services Assistant

## 2023-03-08 ENCOUNTER — TELEPHONE (OUTPATIENT)
Dept: OBGYN | Facility: CLINIC | Age: 48
End: 2023-03-08

## 2023-03-08 NOTE — TELEPHONE ENCOUNTER
Patient called nurse triage line to confirm that she wants to cancel both 3/21 and 4/21 scheduled surgeries due to a family emergency.  Patient was told that surgery scheduler would reach out if any questions.

## 2023-03-14 ENCOUNTER — TRANSFERRED RECORDS (OUTPATIENT)
Dept: HEALTH INFORMATION MANAGEMENT | Facility: CLINIC | Age: 48
End: 2023-03-14
Payer: COMMERCIAL

## 2023-03-15 DIAGNOSIS — D62 ANEMIA DUE TO BLOOD LOSS, ACUTE: ICD-10-CM

## 2023-03-15 DIAGNOSIS — D25.9 FIBROID UTERUS: Primary | ICD-10-CM

## 2023-03-15 RX ORDER — HEPARIN SODIUM (PORCINE) LOCK FLUSH IV SOLN 100 UNIT/ML 100 UNIT/ML
5 SOLUTION INTRAVENOUS
Status: CANCELLED | OUTPATIENT
Start: 2023-03-15

## 2023-03-15 RX ORDER — DIPHENHYDRAMINE HYDROCHLORIDE 50 MG/ML
50 INJECTION INTRAMUSCULAR; INTRAVENOUS
Status: CANCELLED
Start: 2023-03-15

## 2023-03-15 RX ORDER — EPINEPHRINE 1 MG/ML
0.3 INJECTION, SOLUTION INTRAMUSCULAR; SUBCUTANEOUS EVERY 5 MIN PRN
Status: CANCELLED | OUTPATIENT
Start: 2023-03-15

## 2023-03-15 RX ORDER — ALBUTEROL SULFATE 0.83 MG/ML
2.5 SOLUTION RESPIRATORY (INHALATION)
Status: CANCELLED | OUTPATIENT
Start: 2023-03-15

## 2023-03-15 RX ORDER — HEPARIN SODIUM,PORCINE 10 UNIT/ML
5 VIAL (ML) INTRAVENOUS
Status: CANCELLED | OUTPATIENT
Start: 2023-03-15

## 2023-03-15 RX ORDER — ALBUTEROL SULFATE 90 UG/1
1-2 AEROSOL, METERED RESPIRATORY (INHALATION)
Status: CANCELLED
Start: 2023-03-15

## 2023-03-15 RX ORDER — MEPERIDINE HYDROCHLORIDE 25 MG/ML
25 INJECTION INTRAMUSCULAR; INTRAVENOUS; SUBCUTANEOUS EVERY 30 MIN PRN
Status: CANCELLED | OUTPATIENT
Start: 2023-03-15

## 2023-03-15 RX ORDER — METHYLPREDNISOLONE SODIUM SUCCINATE 125 MG/2ML
125 INJECTION, POWDER, LYOPHILIZED, FOR SOLUTION INTRAMUSCULAR; INTRAVENOUS
Status: CANCELLED
Start: 2023-03-15

## 2023-03-28 ENCOUNTER — TELEPHONE (OUTPATIENT)
Dept: FAMILY MEDICINE | Facility: CLINIC | Age: 48
End: 2023-03-28

## 2023-03-28 ENCOUNTER — LAB (OUTPATIENT)
Dept: LAB | Facility: CLINIC | Age: 48
End: 2023-03-28
Payer: COMMERCIAL

## 2023-03-28 DIAGNOSIS — D62 ANEMIA DUE TO BLOOD LOSS, ACUTE: ICD-10-CM

## 2023-03-28 LAB
ERYTHROCYTE [DISTWIDTH] IN BLOOD BY AUTOMATED COUNT: 18.9 % (ref 10–15)
FERRITIN SERPL-MCNC: 9 NG/ML (ref 6–175)
HCT VFR BLD AUTO: 22.7 % (ref 35–47)
HGB BLD-MCNC: 5.5 G/DL (ref 11.7–15.7)
HOLD SPECIMEN: NORMAL
IRON BINDING CAPACITY (ROCHE): 422 UG/DL (ref 240–430)
IRON SATN MFR SERPL: 2 % (ref 15–46)
IRON SERPL-MCNC: 9 UG/DL (ref 37–145)
MCH RBC QN AUTO: 18.7 PG (ref 26.5–33)
MCHC RBC AUTO-ENTMCNC: 24.2 G/DL (ref 31.5–36.5)
MCV RBC AUTO: 77 FL (ref 78–100)
PLATELET # BLD AUTO: 369 10E3/UL (ref 150–450)
RBC # BLD AUTO: 2.94 10E6/UL (ref 3.8–5.2)
WBC # BLD AUTO: 3.7 10E3/UL (ref 4–11)

## 2023-03-28 PROCEDURE — 83540 ASSAY OF IRON: CPT

## 2023-03-28 PROCEDURE — 83550 IRON BINDING TEST: CPT

## 2023-03-28 PROCEDURE — 36415 COLL VENOUS BLD VENIPUNCTURE: CPT

## 2023-03-28 PROCEDURE — 85027 COMPLETE CBC AUTOMATED: CPT

## 2023-03-28 PROCEDURE — 82728 ASSAY OF FERRITIN: CPT

## 2023-03-28 NOTE — TELEPHONE ENCOUNTER
[facility] phone call message- general phone call:    Reason for call: Kishore with [facility] would like a nurse to call him back to go over some critical lab results. He asked that I rosalio the encounter urgent.     Callback number is [phone]    Return call needed: Yes    OK to leave a message on voice mail? Yes    Primary language: English      needed? No    Call taken on March 28, 2023 at 2:48 PM by Josephine Olivo

## 2023-03-28 NOTE — TELEPHONE ENCOUNTER
"RN spoke to the patient regarding lab result,hemoglobin 5.5g/dl and hematocrit 22.7% and informed her that you need to go to the Emergency  Department in order to get blood transfusions.  Patient \"stated that I need to have Iron infusion and my doctor is aware that I will not except  blood transfusion, could you please ask to send me iron infusion?\" at mean time I made an appointment on 3/31/2023  11:20 am .      Socorro Johnson RN      "

## 2023-03-28 NOTE — TELEPHONE ENCOUNTER
Received critical lab result from Ham at the labe for this patient. Hgb at 5.5.    Spoke with Dr. Abernathy who recommended that patient go to the ER for a transfusion, but patient has refused in the past.    Yasmin Mercado RN

## 2023-03-29 ENCOUNTER — TELEPHONE (OUTPATIENT)
Dept: FAMILY MEDICINE | Facility: CLINIC | Age: 48
End: 2023-03-29

## 2023-03-29 DIAGNOSIS — D25.9 UTERINE LEIOMYOMA, UNSPECIFIED LOCATION: Primary | ICD-10-CM

## 2023-03-29 DIAGNOSIS — D62 ANEMIA DUE TO BLOOD LOSS, ACUTE: ICD-10-CM

## 2023-03-29 RX ORDER — ALBUTEROL SULFATE 90 UG/1
1-2 AEROSOL, METERED RESPIRATORY (INHALATION)
Status: CANCELLED
Start: 2023-03-30

## 2023-03-29 RX ORDER — DIPHENHYDRAMINE HYDROCHLORIDE 50 MG/ML
50 INJECTION INTRAMUSCULAR; INTRAVENOUS
Status: CANCELLED
Start: 2023-03-30

## 2023-03-29 RX ORDER — MEPERIDINE HYDROCHLORIDE 25 MG/ML
25 INJECTION INTRAMUSCULAR; INTRAVENOUS; SUBCUTANEOUS EVERY 30 MIN PRN
Status: CANCELLED | OUTPATIENT
Start: 2023-03-30

## 2023-03-29 RX ORDER — ALBUTEROL SULFATE 0.83 MG/ML
2.5 SOLUTION RESPIRATORY (INHALATION)
Status: CANCELLED | OUTPATIENT
Start: 2023-03-30

## 2023-03-29 RX ORDER — HEPARIN SODIUM (PORCINE) LOCK FLUSH IV SOLN 100 UNIT/ML 100 UNIT/ML
5 SOLUTION INTRAVENOUS
Status: CANCELLED | OUTPATIENT
Start: 2023-03-30

## 2023-03-29 RX ORDER — EPINEPHRINE 1 MG/ML
0.3 INJECTION, SOLUTION, CONCENTRATE INTRAVENOUS EVERY 5 MIN PRN
Status: CANCELLED | OUTPATIENT
Start: 2023-03-30

## 2023-03-29 RX ORDER — HEPARIN SODIUM,PORCINE 10 UNIT/ML
5 VIAL (ML) INTRAVENOUS
Status: CANCELLED | OUTPATIENT
Start: 2023-03-30

## 2023-03-29 RX ORDER — METHYLPREDNISOLONE SODIUM SUCCINATE 125 MG/2ML
125 INJECTION, POWDER, LYOPHILIZED, FOR SOLUTION INTRAMUSCULAR; INTRAVENOUS
Status: CANCELLED
Start: 2023-03-30

## 2023-03-29 NOTE — TELEPHONE ENCOUNTER
Message reviewed. I agree that it is my strong recommendation that she has a blood transfusion with a hemoglobin <7 (but Anat has declined this on multiple occasions including yesterday). I am doubtful she will be able to obtain an IV infusion appointment within the next 48 hours but I have placed the orders for IV iron. She needs to see me on Friday though, despite this, as we need to make a plan to manage her bleeding and her anemia.     Amanda Abernathy, DO

## 2023-03-29 NOTE — TELEPHONE ENCOUNTER
Socorro Johnson RN called patient and patient refused to go to ER, she is scheduled with dr. Abernathy on Friday 3/31    Yasmin Mercado RN

## 2023-03-29 NOTE — TELEPHONE ENCOUNTER
[facility] phone call message- patient requesting to speak with PCP or provider:    PCP: Amanda Abernathy    Additional Comments: The patient is requesting a new iron infusion order before her appt on Friday, if possible. The patient have heavy bleeding with period. Rukhsana GUTIERREZ-Socorro says the patient's hemoglobin is at a 5. The patient have an appt on 03/31/2023 w/PCP.    Is a call back needed? Yes    Patient informed that it may take up to 2 business days to hear back from PCP:Yes    OK to leave a message on voice mail? Yes    Primary language: English      needed? No    Call taken on March 29, 2023 at 12:45 PM by Stacey Vee

## 2023-03-29 NOTE — TELEPHONE ENCOUNTER
Writer called patient to further advise, no answer. Phone went to . Left  advising orders placed, requested regardless of being able to get infusion or not, we want to see Anat in clinic Friday to come up with a plan with the physician. Requested call back to Rukhsana with questions or concerns. Edil GUTIERREZ

## 2023-03-29 NOTE — TELEPHONE ENCOUNTER
Just spoke with Dr Wan and he recommends the patient go to the ER as soon as possible. The patient is resistant to going to the ER and just wants the iron infusion order to be renewed.

## 2023-03-31 ENCOUNTER — TELEPHONE (OUTPATIENT)
Dept: FAMILY MEDICINE | Facility: CLINIC | Age: 48
End: 2023-03-31

## 2023-04-03 ENCOUNTER — INFUSION THERAPY VISIT (OUTPATIENT)
Dept: INFUSION THERAPY | Facility: HOSPITAL | Age: 48
End: 2023-04-03
Attending: OBSTETRICS & GYNECOLOGY
Payer: COMMERCIAL

## 2023-04-03 VITALS
SYSTOLIC BLOOD PRESSURE: 124 MMHG | RESPIRATION RATE: 18 BRPM | OXYGEN SATURATION: 96 % | HEART RATE: 100 BPM | DIASTOLIC BLOOD PRESSURE: 71 MMHG

## 2023-04-03 DIAGNOSIS — D25.9 FIBROID UTERUS: Primary | ICD-10-CM

## 2023-04-03 DIAGNOSIS — D62 ANEMIA DUE TO BLOOD LOSS, ACUTE: ICD-10-CM

## 2023-04-03 DIAGNOSIS — D25.9 UTERINE LEIOMYOMA, UNSPECIFIED LOCATION: ICD-10-CM

## 2023-04-03 PROCEDURE — 96365 THER/PROPH/DIAG IV INF INIT: CPT

## 2023-04-03 PROCEDURE — 250N000011 HC RX IP 250 OP 636: Performed by: FAMILY MEDICINE

## 2023-04-03 PROCEDURE — 96366 THER/PROPH/DIAG IV INF ADDON: CPT

## 2023-04-03 PROCEDURE — 258N000003 HC RX IP 258 OP 636: Performed by: FAMILY MEDICINE

## 2023-04-03 RX ORDER — MEPERIDINE HYDROCHLORIDE 25 MG/ML
25 INJECTION INTRAMUSCULAR; INTRAVENOUS; SUBCUTANEOUS EVERY 30 MIN PRN
Status: DISCONTINUED | OUTPATIENT
Start: 2023-04-03 | End: 2023-04-03

## 2023-04-03 RX ORDER — LEVALBUTEROL INHALATION SOLUTION 1.25 MG/3ML
1.25 SOLUTION RESPIRATORY (INHALATION)
Status: ACTIVE | OUTPATIENT
Start: 2023-04-03

## 2023-04-03 RX ORDER — DIPHENHYDRAMINE HYDROCHLORIDE 50 MG/ML
50 INJECTION INTRAMUSCULAR; INTRAVENOUS
Status: DISCONTINUED | OUTPATIENT
Start: 2023-04-03 | End: 2023-04-03 | Stop reason: HOSPADM

## 2023-04-03 RX ORDER — ALBUTEROL SULFATE 0.83 MG/ML
2.5 SOLUTION RESPIRATORY (INHALATION)
Status: DISCONTINUED | OUTPATIENT
Start: 2023-04-03 | End: 2023-04-03

## 2023-04-03 RX ORDER — MEPERIDINE HYDROCHLORIDE 25 MG/ML
25 INJECTION INTRAMUSCULAR; INTRAVENOUS; SUBCUTANEOUS EVERY 30 MIN PRN
Status: CANCELLED | OUTPATIENT
Start: 2023-04-03

## 2023-04-03 RX ORDER — DIPHENHYDRAMINE HYDROCHLORIDE 50 MG/ML
50 INJECTION INTRAMUSCULAR; INTRAVENOUS
Status: CANCELLED
Start: 2023-04-03

## 2023-04-03 RX ORDER — EPINEPHRINE 1 MG/ML
0.3 INJECTION, SOLUTION INTRAMUSCULAR; SUBCUTANEOUS EVERY 5 MIN PRN
Status: DISCONTINUED | OUTPATIENT
Start: 2023-04-03 | End: 2023-04-03 | Stop reason: HOSPADM

## 2023-04-03 RX ORDER — EPINEPHRINE 1 MG/ML
0.3 INJECTION, SOLUTION INTRAMUSCULAR; SUBCUTANEOUS EVERY 5 MIN PRN
Status: CANCELLED | OUTPATIENT
Start: 2023-04-03

## 2023-04-03 RX ORDER — HEPARIN SODIUM,PORCINE 10 UNIT/ML
5 VIAL (ML) INTRAVENOUS
Status: CANCELLED | OUTPATIENT
Start: 2023-04-03

## 2023-04-03 RX ORDER — ALBUTEROL SULFATE 0.83 MG/ML
2.5 SOLUTION RESPIRATORY (INHALATION)
Status: CANCELLED | OUTPATIENT
Start: 2023-04-05

## 2023-04-03 RX ORDER — ALBUTEROL SULFATE 90 UG/1
1-2 AEROSOL, METERED RESPIRATORY (INHALATION)
Status: CANCELLED
Start: 2023-04-05

## 2023-04-03 RX ORDER — DIPHENHYDRAMINE HYDROCHLORIDE 50 MG/ML
50 INJECTION INTRAMUSCULAR; INTRAVENOUS
Status: CANCELLED
Start: 2023-04-05

## 2023-04-03 RX ORDER — HEPARIN SODIUM,PORCINE 10 UNIT/ML
5 VIAL (ML) INTRAVENOUS
Status: CANCELLED | OUTPATIENT
Start: 2023-04-05

## 2023-04-03 RX ORDER — ALBUTEROL SULFATE 90 UG/1
1-2 AEROSOL, METERED RESPIRATORY (INHALATION)
Status: DISCONTINUED | OUTPATIENT
Start: 2023-04-03 | End: 2023-04-03

## 2023-04-03 RX ORDER — EPINEPHRINE 1 MG/ML
0.3 INJECTION, SOLUTION INTRAMUSCULAR; SUBCUTANEOUS EVERY 5 MIN PRN
Status: DISCONTINUED | OUTPATIENT
Start: 2023-04-03 | End: 2023-04-03

## 2023-04-03 RX ORDER — DIPHENHYDRAMINE HYDROCHLORIDE 50 MG/ML
50 INJECTION INTRAMUSCULAR; INTRAVENOUS
Status: DISCONTINUED | OUTPATIENT
Start: 2023-04-03 | End: 2023-04-03

## 2023-04-03 RX ORDER — HEPARIN SODIUM (PORCINE) LOCK FLUSH IV SOLN 100 UNIT/ML 100 UNIT/ML
5 SOLUTION INTRAVENOUS
Status: CANCELLED | OUTPATIENT
Start: 2023-04-05

## 2023-04-03 RX ORDER — ALBUTEROL SULFATE 0.83 MG/ML
2.5 SOLUTION RESPIRATORY (INHALATION)
Status: CANCELLED | OUTPATIENT
Start: 2023-04-03

## 2023-04-03 RX ORDER — METHYLPREDNISOLONE SODIUM SUCCINATE 125 MG/2ML
125 INJECTION, POWDER, LYOPHILIZED, FOR SOLUTION INTRAMUSCULAR; INTRAVENOUS
Status: CANCELLED
Start: 2023-04-03

## 2023-04-03 RX ORDER — METHYLPREDNISOLONE SODIUM SUCCINATE 125 MG/2ML
125 INJECTION, POWDER, LYOPHILIZED, FOR SOLUTION INTRAMUSCULAR; INTRAVENOUS
Status: DISCONTINUED | OUTPATIENT
Start: 2023-04-03 | End: 2023-04-03

## 2023-04-03 RX ORDER — EPINEPHRINE 1 MG/ML
0.3 INJECTION, SOLUTION INTRAMUSCULAR; SUBCUTANEOUS EVERY 5 MIN PRN
Status: CANCELLED | OUTPATIENT
Start: 2023-04-05

## 2023-04-03 RX ORDER — ALBUTEROL SULFATE 90 UG/1
1-2 AEROSOL, METERED RESPIRATORY (INHALATION)
Status: CANCELLED
Start: 2023-04-03

## 2023-04-03 RX ORDER — MEPERIDINE HYDROCHLORIDE 25 MG/ML
25 INJECTION INTRAMUSCULAR; INTRAVENOUS; SUBCUTANEOUS EVERY 30 MIN PRN
Status: DISCONTINUED | OUTPATIENT
Start: 2023-04-03 | End: 2023-04-03 | Stop reason: HOSPADM

## 2023-04-03 RX ORDER — METHYLPREDNISOLONE SODIUM SUCCINATE 125 MG/2ML
125 INJECTION, POWDER, LYOPHILIZED, FOR SOLUTION INTRAMUSCULAR; INTRAVENOUS
Status: CANCELLED
Start: 2023-04-05

## 2023-04-03 RX ORDER — ALBUTEROL SULFATE 90 UG/1
1-2 AEROSOL, METERED RESPIRATORY (INHALATION)
Status: DISCONTINUED | OUTPATIENT
Start: 2023-04-03 | End: 2023-04-03 | Stop reason: HOSPADM

## 2023-04-03 RX ORDER — HEPARIN SODIUM (PORCINE) LOCK FLUSH IV SOLN 100 UNIT/ML 100 UNIT/ML
5 SOLUTION INTRAVENOUS
Status: CANCELLED | OUTPATIENT
Start: 2023-04-03

## 2023-04-03 RX ORDER — METHYLPREDNISOLONE SODIUM SUCCINATE 125 MG/2ML
125 INJECTION, POWDER, LYOPHILIZED, FOR SOLUTION INTRAMUSCULAR; INTRAVENOUS
Status: DISCONTINUED | OUTPATIENT
Start: 2023-04-03 | End: 2023-04-03 | Stop reason: HOSPADM

## 2023-04-03 RX ORDER — MEPERIDINE HYDROCHLORIDE 25 MG/ML
25 INJECTION INTRAMUSCULAR; INTRAVENOUS; SUBCUTANEOUS EVERY 30 MIN PRN
Status: CANCELLED | OUTPATIENT
Start: 2023-04-05

## 2023-04-03 RX ADMIN — SODIUM CHLORIDE 250 ML: 9 INJECTION, SOLUTION INTRAVENOUS at 13:01

## 2023-04-03 RX ADMIN — IRON SUCROSE 300 MG: 20 INJECTION, SOLUTION INTRAVENOUS at 13:01

## 2023-04-03 NOTE — PROGRESS NOTES
Infusion Nursing Note:  Anat Su presents today for Iron Sucrose, dose 1/3.    Patient seen by provider today: No   present during visit today: Not Applicable.    Note: Anat comes to infusion ambulatory and in stable condition. Confirms that she is here for an Iron infusion today. States that she has more questions for the physician, and some clarification to do prior to receiving a Lupron injection. Therefore, Lupron was not given today. A peripheral IV was placed in the right forearm x 1 attempt. Iron Sucrose was administered over 90 minutes and then the line was flushed with NS afterwards. Once completed, the IV was removed and the site was covered with 2x2 gauze and secured in place with coban. Reviewed upcoming appointment. Left infusion ambulatory and in stable condition.     Intravenous Access:  Peripheral IV placed.    Treatment Conditions:  Not Applicable.    Post Infusion Assessment:  Patient tolerated infusion without incident.  Site patent and intact, free from redness, edema or discomfort.  Access discontinued per protocol.     Discharge Plan:   Discharge instructions reviewed with: Patient.  Patient and/or family verbalized understanding of discharge instructions and all questions answered.  AVS to patient via [employer].  Patient will go to her next appointment on 4/26  Patient discharged in stable condition accompanied by: self.  Departure Mode: Ambulatory.    Kirstin Hubbard RN

## 2023-04-06 ENCOUNTER — INFUSION THERAPY VISIT (OUTPATIENT)
Dept: INFUSION THERAPY | Facility: CLINIC | Age: 48
End: 2023-04-06
Attending: FAMILY MEDICINE
Payer: COMMERCIAL

## 2023-04-06 VITALS
DIASTOLIC BLOOD PRESSURE: 84 MMHG | SYSTOLIC BLOOD PRESSURE: 149 MMHG | OXYGEN SATURATION: 100 % | RESPIRATION RATE: 18 BRPM | HEART RATE: 83 BPM | TEMPERATURE: 97.9 F

## 2023-04-06 DIAGNOSIS — D25.9 UTERINE LEIOMYOMA, UNSPECIFIED LOCATION: ICD-10-CM

## 2023-04-06 DIAGNOSIS — D62 ANEMIA DUE TO BLOOD LOSS, ACUTE: Primary | ICD-10-CM

## 2023-04-06 PROCEDURE — 96366 THER/PROPH/DIAG IV INF ADDON: CPT

## 2023-04-06 PROCEDURE — 250N000011 HC RX IP 250 OP 636: Performed by: FAMILY MEDICINE

## 2023-04-06 PROCEDURE — 96365 THER/PROPH/DIAG IV INF INIT: CPT

## 2023-04-06 PROCEDURE — 258N000003 HC RX IP 258 OP 636: Performed by: FAMILY MEDICINE

## 2023-04-06 RX ORDER — MEPERIDINE HYDROCHLORIDE 25 MG/ML
25 INJECTION INTRAMUSCULAR; INTRAVENOUS; SUBCUTANEOUS EVERY 30 MIN PRN
Status: CANCELLED | OUTPATIENT
Start: 2023-04-07

## 2023-04-06 RX ORDER — DIPHENHYDRAMINE HYDROCHLORIDE 50 MG/ML
50 INJECTION INTRAMUSCULAR; INTRAVENOUS
Status: CANCELLED
Start: 2023-04-07

## 2023-04-06 RX ORDER — EPINEPHRINE 1 MG/ML
0.3 INJECTION, SOLUTION, CONCENTRATE INTRAVENOUS EVERY 5 MIN PRN
Status: CANCELLED | OUTPATIENT
Start: 2023-04-07

## 2023-04-06 RX ORDER — ALBUTEROL SULFATE 90 UG/1
1-2 AEROSOL, METERED RESPIRATORY (INHALATION)
Status: CANCELLED
Start: 2023-04-07

## 2023-04-06 RX ORDER — METHYLPREDNISOLONE SODIUM SUCCINATE 125 MG/2ML
125 INJECTION, POWDER, LYOPHILIZED, FOR SOLUTION INTRAMUSCULAR; INTRAVENOUS
Status: CANCELLED
Start: 2023-04-07

## 2023-04-06 RX ORDER — ALBUTEROL SULFATE 0.83 MG/ML
2.5 SOLUTION RESPIRATORY (INHALATION)
Status: CANCELLED | OUTPATIENT
Start: 2023-04-07

## 2023-04-06 RX ORDER — HEPARIN SODIUM,PORCINE 10 UNIT/ML
5 VIAL (ML) INTRAVENOUS
Status: CANCELLED | OUTPATIENT
Start: 2023-04-07

## 2023-04-06 RX ORDER — HEPARIN SODIUM (PORCINE) LOCK FLUSH IV SOLN 100 UNIT/ML 100 UNIT/ML
5 SOLUTION INTRAVENOUS
Status: CANCELLED | OUTPATIENT
Start: 2023-04-07

## 2023-04-06 RX ADMIN — IRON SUCROSE 300 MG: 20 INJECTION, SOLUTION INTRAVENOUS at 09:38

## 2023-04-06 ASSESSMENT — PAIN SCALES - GENERAL: PAINLEVEL: NO PAIN (0)

## 2023-04-06 NOTE — PROGRESS NOTES
Infusion Nursing Note:  Anat Su presents today for 2nd dose of venofer.    Patient seen by provider today: No   present during visit today: Not Applicable.    Note: Patient states she is feeling better, not as tired or short of breath.    Intravenous Access:  Peripheral IV placed.    Treatment Conditions:  Not Applicable.    Post Infusion Assessment:  Patient tolerated infusion without incident.  Blood return noted pre and post infusion.  Site patent and intact, free from redness, edema or discomfort.  No evidence of extravasations.  Access discontinued per protocol.     Discharge Plan:   Patient discharged in stable condition accompanied by: self.  Departure Mode: Ambulatory.  Will return to clinic next Wednesday for final dose.    Jackie Grimes RN

## 2023-04-12 ENCOUNTER — INFUSION THERAPY VISIT (OUTPATIENT)
Dept: INFUSION THERAPY | Facility: CLINIC | Age: 48
End: 2023-04-12
Attending: FAMILY MEDICINE
Payer: COMMERCIAL

## 2023-04-12 VITALS — DIASTOLIC BLOOD PRESSURE: 79 MMHG | OXYGEN SATURATION: 100 % | HEART RATE: 82 BPM | SYSTOLIC BLOOD PRESSURE: 130 MMHG

## 2023-04-12 DIAGNOSIS — D25.9 UTERINE LEIOMYOMA, UNSPECIFIED LOCATION: ICD-10-CM

## 2023-04-12 DIAGNOSIS — D62 ANEMIA DUE TO BLOOD LOSS, ACUTE: Primary | ICD-10-CM

## 2023-04-12 PROCEDURE — 96365 THER/PROPH/DIAG IV INF INIT: CPT

## 2023-04-12 PROCEDURE — 96366 THER/PROPH/DIAG IV INF ADDON: CPT

## 2023-04-12 PROCEDURE — 258N000003 HC RX IP 258 OP 636: Performed by: FAMILY MEDICINE

## 2023-04-12 PROCEDURE — 250N000011 HC RX IP 250 OP 636: Performed by: FAMILY MEDICINE

## 2023-04-12 RX ORDER — HEPARIN SODIUM,PORCINE 10 UNIT/ML
5 VIAL (ML) INTRAVENOUS
Status: CANCELLED | OUTPATIENT
Start: 2023-04-12

## 2023-04-12 RX ORDER — EPINEPHRINE 1 MG/ML
0.3 INJECTION, SOLUTION, CONCENTRATE INTRAVENOUS EVERY 5 MIN PRN
Status: CANCELLED | OUTPATIENT
Start: 2023-04-12

## 2023-04-12 RX ORDER — ALBUTEROL SULFATE 90 UG/1
1-2 AEROSOL, METERED RESPIRATORY (INHALATION)
Status: CANCELLED
Start: 2023-04-12

## 2023-04-12 RX ORDER — DIPHENHYDRAMINE HYDROCHLORIDE 50 MG/ML
50 INJECTION INTRAMUSCULAR; INTRAVENOUS
Status: CANCELLED
Start: 2023-04-12

## 2023-04-12 RX ORDER — HEPARIN SODIUM (PORCINE) LOCK FLUSH IV SOLN 100 UNIT/ML 100 UNIT/ML
5 SOLUTION INTRAVENOUS
Status: CANCELLED | OUTPATIENT
Start: 2023-04-12

## 2023-04-12 RX ORDER — MEPERIDINE HYDROCHLORIDE 25 MG/ML
25 INJECTION INTRAMUSCULAR; INTRAVENOUS; SUBCUTANEOUS EVERY 30 MIN PRN
Status: CANCELLED | OUTPATIENT
Start: 2023-04-12

## 2023-04-12 RX ORDER — METHYLPREDNISOLONE SODIUM SUCCINATE 125 MG/2ML
125 INJECTION, POWDER, LYOPHILIZED, FOR SOLUTION INTRAMUSCULAR; INTRAVENOUS
Status: CANCELLED
Start: 2023-04-12

## 2023-04-12 RX ORDER — ALBUTEROL SULFATE 0.83 MG/ML
2.5 SOLUTION RESPIRATORY (INHALATION)
Status: CANCELLED | OUTPATIENT
Start: 2023-04-12

## 2023-04-12 RX ADMIN — IRON SUCROSE 300 MG: 20 INJECTION, SOLUTION INTRAVENOUS at 14:15

## 2023-04-12 NOTE — PROGRESS NOTES
Infusion Nursing Note:  Anat Su presents today for Venofer.    Patient seen by provider today: No   present during visit today: Not Applicable.    Note: N/A.    Intravenous Access:  Peripheral IV placed.    Treatment Conditions:  Not Applicable.    Post Infusion Assessment:  Patient tolerated infusion without incident.  No evidence of extravasations.  Access discontinued per protocol.     Discharge Plan:   Patient and/or family verbalized understanding of discharge instructions and all questions answered.  Patient discharged in stable condition accompanied by: self.      KEVIN ROJAS RN

## 2023-04-18 ENCOUNTER — INFUSION THERAPY VISIT (OUTPATIENT)
Dept: INFUSION THERAPY | Facility: HOSPITAL | Age: 48
End: 2023-04-18
Attending: OBSTETRICS & GYNECOLOGY
Payer: COMMERCIAL

## 2023-04-18 VITALS
DIASTOLIC BLOOD PRESSURE: 84 MMHG | SYSTOLIC BLOOD PRESSURE: 157 MMHG | OXYGEN SATURATION: 98 % | TEMPERATURE: 99 F | HEART RATE: 85 BPM | RESPIRATION RATE: 18 BRPM

## 2023-04-18 DIAGNOSIS — D62 ANEMIA DUE TO BLOOD LOSS, ACUTE: ICD-10-CM

## 2023-04-18 DIAGNOSIS — D25.9 UTERINE LEIOMYOMA, UNSPECIFIED LOCATION: Primary | ICD-10-CM

## 2023-04-18 PROCEDURE — 250N000011 HC RX IP 250 OP 636: Performed by: OBSTETRICS & GYNECOLOGY

## 2023-04-18 PROCEDURE — 96372 THER/PROPH/DIAG INJ SC/IM: CPT | Performed by: OBSTETRICS & GYNECOLOGY

## 2023-04-18 PROCEDURE — 96402 CHEMO HORMON ANTINEOPL SQ/IM: CPT

## 2023-04-18 RX ORDER — MEPERIDINE HYDROCHLORIDE 25 MG/ML
25 INJECTION INTRAMUSCULAR; INTRAVENOUS; SUBCUTANEOUS EVERY 30 MIN PRN
Status: DISCONTINUED | OUTPATIENT
Start: 2023-04-18 | End: 2023-04-18

## 2023-04-18 RX ORDER — HEPARIN SODIUM (PORCINE) LOCK FLUSH IV SOLN 100 UNIT/ML 100 UNIT/ML
5 SOLUTION INTRAVENOUS
Status: CANCELLED | OUTPATIENT
Start: 2023-04-18

## 2023-04-18 RX ORDER — MEPERIDINE HYDROCHLORIDE 25 MG/ML
25 INJECTION INTRAMUSCULAR; INTRAVENOUS; SUBCUTANEOUS EVERY 30 MIN PRN
Status: CANCELLED | OUTPATIENT
Start: 2023-04-18

## 2023-04-18 RX ORDER — DIPHENHYDRAMINE HYDROCHLORIDE 50 MG/ML
50 INJECTION INTRAMUSCULAR; INTRAVENOUS
Status: CANCELLED
Start: 2023-04-18

## 2023-04-18 RX ORDER — DIPHENHYDRAMINE HYDROCHLORIDE 50 MG/ML
50 INJECTION INTRAMUSCULAR; INTRAVENOUS
Status: DISCONTINUED | OUTPATIENT
Start: 2023-04-18 | End: 2023-04-18

## 2023-04-18 RX ORDER — ALBUTEROL SULFATE 90 UG/1
1-2 AEROSOL, METERED RESPIRATORY (INHALATION)
Status: DISCONTINUED | OUTPATIENT
Start: 2023-04-18 | End: 2023-04-18

## 2023-04-18 RX ORDER — METHYLPREDNISOLONE SODIUM SUCCINATE 125 MG/2ML
125 INJECTION, POWDER, LYOPHILIZED, FOR SOLUTION INTRAMUSCULAR; INTRAVENOUS
Status: CANCELLED
Start: 2023-04-18

## 2023-04-18 RX ORDER — EPINEPHRINE 1 MG/ML
0.3 INJECTION, SOLUTION INTRAMUSCULAR; SUBCUTANEOUS EVERY 5 MIN PRN
Status: CANCELLED | OUTPATIENT
Start: 2023-04-18

## 2023-04-18 RX ORDER — METHYLPREDNISOLONE SODIUM SUCCINATE 125 MG/2ML
125 INJECTION, POWDER, LYOPHILIZED, FOR SOLUTION INTRAMUSCULAR; INTRAVENOUS
Status: DISCONTINUED | OUTPATIENT
Start: 2023-04-18 | End: 2023-04-18

## 2023-04-18 RX ORDER — ALBUTEROL SULFATE 0.83 MG/ML
2.5 SOLUTION RESPIRATORY (INHALATION)
Status: DISCONTINUED | OUTPATIENT
Start: 2023-04-18 | End: 2023-04-18

## 2023-04-18 RX ORDER — ALBUTEROL SULFATE 0.83 MG/ML
2.5 SOLUTION RESPIRATORY (INHALATION)
Status: CANCELLED | OUTPATIENT
Start: 2023-04-18

## 2023-04-18 RX ORDER — ALBUTEROL SULFATE 90 UG/1
1-2 AEROSOL, METERED RESPIRATORY (INHALATION)
Status: CANCELLED
Start: 2023-04-18

## 2023-04-18 RX ORDER — EPINEPHRINE 1 MG/ML
0.3 INJECTION, SOLUTION INTRAMUSCULAR; SUBCUTANEOUS EVERY 5 MIN PRN
Status: DISCONTINUED | OUTPATIENT
Start: 2023-04-18 | End: 2023-04-18

## 2023-04-18 RX ORDER — HEPARIN SODIUM,PORCINE 10 UNIT/ML
5 VIAL (ML) INTRAVENOUS
Status: CANCELLED | OUTPATIENT
Start: 2023-04-18

## 2023-04-18 RX ADMIN — LEUPROLIDE ACETATE 11.25 MG: KIT at 14:48

## 2023-04-18 ASSESSMENT — PAIN SCALES - GENERAL: PAINLEVEL: NO PAIN (0)

## 2023-04-18 NOTE — PROGRESS NOTES
Infusion Nursing Note:  Anat Su presents today for Lupron injection.    Patient seen by provider today: No   present during visit today: Not Applicable.    Note: Patient arrives today via ambulatory for her Lupron injection.  Patent is accompanied by her sister.  Patient states she was educated on the risks and side effects of the injection and is willing to proceed.  Patient states she is very nervous therefore her BP is slightly elevated.  Patient also states she is fasting today but feels well otherwise.  Patient plans to have surgery to remove her fibroid so she may not need future Lupron, therapy plan is for one dose today only.  Patient will return only if needed.        Intravenous Access:  No Intravenous access/labs at this visit.    Treatment Conditions:  Not Applicable.      Post Infusion Assessment:  Patient tolerated injection without incident.       Discharge Plan:   Discharge instructions reviewed with: Patient and Family.  Patient and/or family verbalized understanding of discharge instructions and all questions answered.  Copy of AVS reviewed with patient and/or family.  Patient will return PRN for next appointment.  Patient discharged in stable condition accompanied by: self and sister.  Departure Mode: Ambulatory.      DOMINGUEZ TOVAR RN

## 2023-05-01 ENCOUNTER — OFFICE VISIT (OUTPATIENT)
Dept: FAMILY MEDICINE | Facility: CLINIC | Age: 48
End: 2023-05-01
Payer: COMMERCIAL

## 2023-05-01 VITALS
WEIGHT: 211 LBS | TEMPERATURE: 97.7 F | DIASTOLIC BLOOD PRESSURE: 82 MMHG | RESPIRATION RATE: 18 BRPM | HEART RATE: 90 BPM | SYSTOLIC BLOOD PRESSURE: 143 MMHG | BODY MASS INDEX: 33.31 KG/M2 | OXYGEN SATURATION: 100 %

## 2023-05-01 DIAGNOSIS — Z00.00 HEALTHCARE MAINTENANCE: ICD-10-CM

## 2023-05-01 DIAGNOSIS — D25.1 INTRAMURAL LEIOMYOMA OF UTERUS: Primary | ICD-10-CM

## 2023-05-01 DIAGNOSIS — D64.9 SEVERE ANEMIA: ICD-10-CM

## 2023-05-01 PROCEDURE — 99214 OFFICE O/P EST MOD 30 MIN: CPT | Performed by: STUDENT IN AN ORGANIZED HEALTH CARE EDUCATION/TRAINING PROGRAM

## 2023-05-01 RX ORDER — CHOLECALCIFEROL (VITAMIN D3) 50 MCG
1 TABLET ORAL DAILY
Qty: 90 TABLET | Refills: 3 | Status: SHIPPED | OUTPATIENT
Start: 2023-05-01

## 2023-05-01 RX ORDER — IBUPROFEN 600 MG/1
600 TABLET, FILM COATED ORAL EVERY 6 HOURS PRN
Qty: 90 TABLET | Refills: 3 | Status: SHIPPED | OUTPATIENT
Start: 2023-05-01 | End: 2023-06-22

## 2023-05-01 RX ORDER — IBUPROFEN 600 MG/1
600 TABLET, FILM COATED ORAL EVERY 6 HOURS PRN
COMMUNITY
End: 2023-05-01

## 2023-05-01 NOTE — PROGRESS NOTES
"  Assessment & Plan     Intramural leiomyoma of uterus  Severe anemia  Severe fibroids with AUB causing severe blood loss anemia. Chart reviewed, now s/p IV iron sucrose 300mg x9 doses from 12/15/22 through 4/12/23. Completed IV iron x3 in April. Did receive IV lupron on 4/18/23 and bleeding recently stopped. She agrees that she needs definitve surgical management - will have Care Coordinator assist with reaching out to Ob/Gyn and will also route to provider to assist with coordination. Can plan to follow up here for pre-op visit once scheduled.     Shared decision making - we will recheck labs on Friday. I explained that I really don't think more IV iron is the answer given how much she has gotten already. She feels that because she has been bleeding so much, she needs more iron to replace the bleeding. She does not want blood transfusion if she is going to \"bleed it out.\" I explained the reasoning for transfusion threshold of 7 based on oxygen carrying capacity and preventing hypoxemia of organs and she says she does understand but probably would decline at this time. She feels like her Hgb has improved.     I did discuss pre-operative Hgb goal w/ Anat and while she would be hesitant to accept transfusion right now, she does agree with plan to recheck Hgb several days prior to uterine surgery and blood transfusion to goal of >10 pre-operatively.     - CBC with platelets  - Ferritin  - Iron and iron binding capacity  - Transferrin  - ibuprofen (ADVIL/MOTRIN) 600 MG tablet  Dispense: 90 tablet; Refill: 3    Healthcare maintenance  Refills   - vitamin D3 (CHOLECALCIFEROL) 50 mcg (2000 units) tablet  Dispense: 90 tablet; Refill: 3    Care coordination: I did try to obtain outside records from [place]. She declined filling out an GL[facility] and doesn't remember name of clinic of her Heme/Onc provider, thinks he retired.     Ordering of each unique test  Prescription drug management  35 minutes spent by me on the date of the " "encounter doing chart review, history and exam, documentation and further activities per the note     BMI:   Estimated body mass index is 33.31 kg/m  as calculated from the following:    Height as of 2/24/23: 1.695 m (5' 6.73\").    Weight as of this encounter: 95.7 kg (211 lb).   Not addressed       Return in about 1 month (around 6/1/2023) for Follow up, with PCP.    Jojo Rae DO  [facility] SUSAN Coppola is a 48 year old, presenting for the following health issues:  RECHECK      HPI   Today patient presents for follow up  Had to cancel surgeries w/ Chema dueto family emergency. Want sto reschedule. Before was told Chema was out so not totally sure what to do. Would like some help.     Has stil had bleeding on and off. Completed IV iron x3 in April. Last was IV iron x3 in January.   Just finally got Lupron on 4/18/23. Had not gotten any before then, states this was her first. Bleeding has now stopped. Is feeling hopeful that this will help.     Reviewed surgical plans. She understands she needs something done. Initially agreed with me that it made sense to have a hysterectomy but then reflects that her surgeon wouldn't have offered myomectomy unless it was an option and had a friend who had a large fibroid and had success with just a myomectomy.     Doesn't want her hemoglobin checked today unless she can get more iron. She keeps bleeding so she thinks she needs more iron. Doesn't think it make sense to get blood now if she's going to kepe bleeding.         Patient Active Problem List   Diagnosis     Anemia due to blood loss, acute     Fibroid uterus        Review of Systems   Pertinent positives and negatives per HPI.    Fatigue has improved but still there  Not as bad as it was  Dizzy sometimes  Pelvic pain sometimes - ibuprofen helps         Objective    BP (!) 143/82   Pulse 90   Temp 97.7  F (36.5  C)   Resp 18   Wt 95.7 kg (211 lb)   SpO2 100%   BMI 33.31 kg/m  "   Body mass index is 33.31 kg/m .  Physical Exam   GENERAL: alert, cooperative, in no acute distress  HEENT: sclera clear  PULM: normal respiratory effort   NEURO: alert and oriented, grossly intact, moves all extremities, normal gait   SKIN: no rashes or lesions visualized   PSYCH: euthymic affect

## 2023-05-03 DIAGNOSIS — D25.0 INTRAMURAL, SUBMUCOUS, AND SUBSEROUS LEIOMYOMA OF UTERUS: Primary | ICD-10-CM

## 2023-05-03 DIAGNOSIS — D25.1 INTRAMURAL, SUBMUCOUS, AND SUBSEROUS LEIOMYOMA OF UTERUS: Primary | ICD-10-CM

## 2023-05-03 DIAGNOSIS — D25.2 INTRAMURAL, SUBMUCOUS, AND SUBSEROUS LEIOMYOMA OF UTERUS: Primary | ICD-10-CM

## 2023-05-03 RX ORDER — KETOROLAC TROMETHAMINE 30 MG/ML
30 INJECTION, SOLUTION INTRAMUSCULAR; INTRAVENOUS ONCE
Status: CANCELLED | OUTPATIENT
Start: 2023-05-03 | End: 2023-05-03

## 2023-05-03 RX ORDER — ACETAMINOPHEN 325 MG/1
975 TABLET ORAL ONCE
Status: CANCELLED | OUTPATIENT
Start: 2023-05-03 | End: 2023-05-03

## 2023-05-09 ENCOUNTER — TELEPHONE (OUTPATIENT)
Dept: FAMILY MEDICINE | Facility: CLINIC | Age: 48
End: 2023-05-09
Payer: COMMERCIAL

## 2023-05-09 NOTE — TELEPHONE ENCOUNTER
RN unable to reach the patient and left VM,when patient call back please relay the message below and help to schedule lab appointment only.      I saw pt in clinic last week for her severe uterine fibroids, bleeding, and anemia, and we had discussed labs on Friday. Doesn't look like those happened.     Please call Anat and remind her that it's so important we recheck labs this week! I think it is long enough from her last IV iron that checking iron levels will give us good information. Labs are ordered - please assist w/ lab only visit scheduling.         Socorro Johnson RN

## 2023-05-17 NOTE — TELEPHONE ENCOUNTER
Patient returned call,  scheduled patient for 11:20 tomorrow for lab only appointment.    Yasmin Mercado RN

## 2023-05-17 NOTE — TELEPHONE ENCOUNTER
2nd attempt to call patient, on both numbers, no answer, not identified vm. Left generic message to call back.    Yasmin Mercado RN

## 2023-05-18 ENCOUNTER — LAB (OUTPATIENT)
Dept: LAB | Facility: CLINIC | Age: 48
End: 2023-05-18
Payer: COMMERCIAL

## 2023-05-18 DIAGNOSIS — D64.9 SEVERE ANEMIA: ICD-10-CM

## 2023-05-18 DIAGNOSIS — D25.1 INTRAMURAL LEIOMYOMA OF UTERUS: ICD-10-CM

## 2023-05-18 LAB
ERYTHROCYTE [DISTWIDTH] IN BLOOD BY AUTOMATED COUNT: 21.6 % (ref 10–15)
FERRITIN SERPL-MCNC: 6 NG/ML (ref 6–175)
HCT VFR BLD AUTO: 26 % (ref 35–47)
HGB BLD-MCNC: 6.7 G/DL (ref 11.7–15.7)
IRON BINDING CAPACITY (ROCHE): 377 UG/DL (ref 240–430)
IRON SATN MFR SERPL: 3 % (ref 15–46)
IRON SERPL-MCNC: 11 UG/DL (ref 37–145)
MCH RBC QN AUTO: 18 PG (ref 26.5–33)
MCHC RBC AUTO-ENTMCNC: 25.8 G/DL (ref 31.5–36.5)
MCV RBC AUTO: 70 FL (ref 78–100)
PLATELET # BLD AUTO: 243 10E3/UL (ref 150–450)
RBC # BLD AUTO: 3.73 10E6/UL (ref 3.8–5.2)
TRANSFERRIN SERPL-MCNC: 348 MG/DL (ref 200–360)
WBC # BLD AUTO: 2.6 10E3/UL (ref 4–11)

## 2023-05-18 PROCEDURE — 83540 ASSAY OF IRON: CPT

## 2023-05-18 PROCEDURE — 84466 ASSAY OF TRANSFERRIN: CPT

## 2023-05-18 PROCEDURE — 82728 ASSAY OF FERRITIN: CPT

## 2023-05-18 PROCEDURE — 85027 COMPLETE CBC AUTOMATED: CPT

## 2023-05-18 PROCEDURE — 36415 COLL VENOUS BLD VENIPUNCTURE: CPT

## 2023-05-19 ENCOUNTER — TELEPHONE (OUTPATIENT)
Dept: FAMILY MEDICINE | Facility: CLINIC | Age: 48
End: 2023-05-19
Payer: COMMERCIAL

## 2023-05-19 DIAGNOSIS — D64.9 SEVERE ANEMIA: Primary | ICD-10-CM

## 2023-05-19 NOTE — TELEPHONE ENCOUNTER
RN unable to reach the patient left message,when calls back please rellay the message below.      Please call Anat with results. Please let her know that the hemoglobin looks better than when it was 5.5 on March 28, but it has only improved to 6.7. This is still below 7.0, and any hemoglobin below 7.0 we recommend a blood transfusion to make sure the organs and tissue are getting enough oxygen in the blood to survive. I am going to try and talk to Dr. Abernathy about this as well since she knows her so well, but my recommendation is that I order a blood transfusion for Anat. She has gotten many doses of iron since January and still the hemoglobin is under 7 so I do not know that more iron will make a difference.     Please ask her what her bleeding has been like since I saw her in clinic the other week. Has she had more vaginal bleeding? Does it seem like bleeding has gotten less since the lupron injection?     Since this is chronic and her vital signs looked good at her last visit I do not feel strongly that she needs to go to the emergency room and that we can get her treatment early next week. If she has questions or wants to talk on the phone more about the plan, unfortunately I am not available this afternoon but could call sometime Monday morning or late Mon afternoon or sometime on Tuesday to discuss further.   Socorro Johnson RN

## 2023-05-22 ENCOUNTER — TELEPHONE (OUTPATIENT)
Dept: FAMILY MEDICINE | Facility: CLINIC | Age: 48
End: 2023-05-22
Payer: COMMERCIAL

## 2023-05-22 DIAGNOSIS — D62 ANEMIA DUE TO BLOOD LOSS, ACUTE: ICD-10-CM

## 2023-05-22 DIAGNOSIS — D64.9 SEVERE ANEMIA: Primary | ICD-10-CM

## 2023-05-22 DIAGNOSIS — D25.9 UTERINE LEIOMYOMA, UNSPECIFIED LOCATION: ICD-10-CM

## 2023-05-22 NOTE — TELEPHONE ENCOUNTER
[facility] phone call message- general phone call:    Reason for call: The patient wants to only speak to Dr. Rae. The patient doesn't want a blood transfusion the patient wants to get the iron infusion.    Return call needed: Yes    OK to leave a message on voice mail? Yes    Primary language: English      needed? No    Call taken on May 22, 2023 at 3:30 PM by Joslyn Abdi

## 2023-05-23 ENCOUNTER — HOSPITAL ENCOUNTER (INPATIENT)
Facility: HOSPITAL | Age: 48
Setting detail: SURGERY ADMIT
End: 2023-05-23
Attending: OBSTETRICS & GYNECOLOGY | Admitting: OBSTETRICS & GYNECOLOGY
Payer: COMMERCIAL

## 2023-05-23 PROBLEM — D64.9 SEVERE ANEMIA: Status: ACTIVE | Noted: 2023-05-23

## 2023-05-23 RX ORDER — METHYLPREDNISOLONE SODIUM SUCCINATE 125 MG/2ML
125 INJECTION, POWDER, LYOPHILIZED, FOR SOLUTION INTRAMUSCULAR; INTRAVENOUS
Status: CANCELLED
Start: 2023-05-23

## 2023-05-23 RX ORDER — MEPERIDINE HYDROCHLORIDE 25 MG/ML
25 INJECTION INTRAMUSCULAR; INTRAVENOUS; SUBCUTANEOUS EVERY 30 MIN PRN
Status: CANCELLED | OUTPATIENT
Start: 2023-05-23

## 2023-05-23 RX ORDER — ALBUTEROL SULFATE 90 UG/1
1-2 AEROSOL, METERED RESPIRATORY (INHALATION)
Status: CANCELLED
Start: 2023-05-23

## 2023-05-23 RX ORDER — ALBUTEROL SULFATE 0.83 MG/ML
2.5 SOLUTION RESPIRATORY (INHALATION)
Status: CANCELLED | OUTPATIENT
Start: 2023-05-23

## 2023-05-23 RX ORDER — EPINEPHRINE 1 MG/ML
0.3 INJECTION, SOLUTION, CONCENTRATE INTRAVENOUS EVERY 5 MIN PRN
Status: CANCELLED | OUTPATIENT
Start: 2023-05-23

## 2023-05-23 RX ORDER — DIPHENHYDRAMINE HYDROCHLORIDE 50 MG/ML
50 INJECTION INTRAMUSCULAR; INTRAVENOUS
Status: CANCELLED
Start: 2023-05-23

## 2023-05-23 RX ORDER — HEPARIN SODIUM (PORCINE) LOCK FLUSH IV SOLN 100 UNIT/ML 100 UNIT/ML
5 SOLUTION INTRAVENOUS
Status: CANCELLED | OUTPATIENT
Start: 2023-05-23

## 2023-05-23 RX ORDER — HEPARIN SODIUM,PORCINE 10 UNIT/ML
5 VIAL (ML) INTRAVENOUS
Status: CANCELLED | OUTPATIENT
Start: 2023-05-23

## 2023-05-23 NOTE — TELEPHONE ENCOUNTER
Discussed care with PCP who had reviewed with PharmD. Agree ferritin is still low likely r/t ongoing vaginal bleeding so less concern for iron overload. PharmD found some review articles noting concerns for hypophosphatemia and other potential lyte abnormalities r/t frequent IV iron. Could also consider epo though if iron remains very low, unlikely to be able to mount significant erythropoiesis.     ++++  Called pt. Received Lupron on 4/18. From April 18 until when she saw me, had daily vaginal bleeding. States since clinic visit on 5/1, has had no vaginal bleeding at all. Is feeling alright - walked around the mall yesterday and could tell she was feeling better.     Anat again declines pRBC transfusion. Does not want to do this until right before a surgery. Thinks her body needs more iron since she bled it all out. Counseled her on recommendation for labs to look at electrolytes, vit D, folate and B12 - she is willing to do this but declines lab check prior to IV iron - will get a lab draw when she is at infusion center. Gave her # to call, ordered IV iron sucrose x3    I also asked her about appts in chart - I see she has procedure w/ Dr. Mckay at [facility] on 6/16. She states she saw someone for a second opinion - friend recommended that doctor. Talked to Dr. Mckay on the phone yesterday. Asked if she minds to put her on the list of surgery. Said one of her nurses will call her to talk about scheduling so Anat isn't sure what the plan is. She plans to see Chema on 5/31. She agrees it is important to make sure her doctors all know about each others' medical plans.     Plan:   - IV iron x3 doses ordered  - labs ordered as above   - will plan on T&S and Hgb within 1 week of surgical procedure with plan for pre-op pRBC transfusion if needed at that time  - pt agrees to call if any further vaginal bleeding   - consider Epo if continuing to decline pRBCs and ferritin/iron levels have improved     FYI to PCP

## 2023-05-30 ENCOUNTER — TELEPHONE (OUTPATIENT)
Dept: FAMILY MEDICINE | Facility: CLINIC | Age: 48
End: 2023-05-30
Payer: COMMERCIAL

## 2023-05-30 NOTE — TELEPHONE ENCOUNTER
Care Coordinator attempted to reach out to patient to remind her of upcoming appointment [facility] @ 7:45a.m (arrive 7:30a.m)    [facility] [address]  [address]  [address]  [place] [address]  [phone]    Had to leave message on patient voicemail with appointment details, along with direct number.    Patient's surgery is scheduled on 6/16/23 with .      Ailin Gabriel  Care Coordinator  [facility]  [phone]

## 2023-06-02 ENCOUNTER — INFUSION THERAPY VISIT (OUTPATIENT)
Dept: INFUSION THERAPY | Facility: CLINIC | Age: 48
End: 2023-06-02
Attending: STUDENT IN AN ORGANIZED HEALTH CARE EDUCATION/TRAINING PROGRAM
Payer: COMMERCIAL

## 2023-06-02 VITALS
OXYGEN SATURATION: 100 % | TEMPERATURE: 98.2 F | RESPIRATION RATE: 18 BRPM | HEART RATE: 90 BPM | SYSTOLIC BLOOD PRESSURE: 153 MMHG | DIASTOLIC BLOOD PRESSURE: 71 MMHG

## 2023-06-02 DIAGNOSIS — D62 ANEMIA DUE TO BLOOD LOSS, ACUTE: Primary | ICD-10-CM

## 2023-06-02 DIAGNOSIS — D25.9 UTERINE LEIOMYOMA, UNSPECIFIED LOCATION: ICD-10-CM

## 2023-06-02 DIAGNOSIS — D64.9 SEVERE ANEMIA: ICD-10-CM

## 2023-06-02 PROCEDURE — 250N000011 HC RX IP 250 OP 636: Performed by: STUDENT IN AN ORGANIZED HEALTH CARE EDUCATION/TRAINING PROGRAM

## 2023-06-02 PROCEDURE — 96366 THER/PROPH/DIAG IV INF ADDON: CPT

## 2023-06-02 PROCEDURE — 96365 THER/PROPH/DIAG IV INF INIT: CPT

## 2023-06-02 RX ORDER — ALBUTEROL SULFATE 0.83 MG/ML
2.5 SOLUTION RESPIRATORY (INHALATION)
Status: CANCELLED | OUTPATIENT
Start: 2023-06-04

## 2023-06-02 RX ORDER — METHYLPREDNISOLONE SODIUM SUCCINATE 125 MG/2ML
125 INJECTION, POWDER, LYOPHILIZED, FOR SOLUTION INTRAMUSCULAR; INTRAVENOUS
Status: CANCELLED
Start: 2023-06-04

## 2023-06-02 RX ORDER — EPINEPHRINE 1 MG/ML
0.3 INJECTION, SOLUTION, CONCENTRATE INTRAVENOUS EVERY 5 MIN PRN
Status: CANCELLED | OUTPATIENT
Start: 2023-06-04

## 2023-06-02 RX ORDER — HEPARIN SODIUM,PORCINE 10 UNIT/ML
5 VIAL (ML) INTRAVENOUS
Status: CANCELLED | OUTPATIENT
Start: 2023-06-04

## 2023-06-02 RX ORDER — HEPARIN SODIUM (PORCINE) LOCK FLUSH IV SOLN 100 UNIT/ML 100 UNIT/ML
5 SOLUTION INTRAVENOUS
Status: CANCELLED | OUTPATIENT
Start: 2023-06-04

## 2023-06-02 RX ORDER — ALBUTEROL SULFATE 90 UG/1
1-2 AEROSOL, METERED RESPIRATORY (INHALATION)
Status: CANCELLED
Start: 2023-06-04

## 2023-06-02 RX ORDER — MEPERIDINE HYDROCHLORIDE 25 MG/ML
25 INJECTION INTRAMUSCULAR; INTRAVENOUS; SUBCUTANEOUS EVERY 30 MIN PRN
Status: CANCELLED | OUTPATIENT
Start: 2023-06-04

## 2023-06-02 RX ORDER — DIPHENHYDRAMINE HYDROCHLORIDE 50 MG/ML
50 INJECTION INTRAMUSCULAR; INTRAVENOUS
Status: CANCELLED
Start: 2023-06-04

## 2023-06-02 RX ADMIN — IRON SUCROSE 300 MG: 20 INJECTION, SOLUTION INTRAVENOUS at 12:13

## 2023-06-02 ASSESSMENT — PAIN SCALES - GENERAL: PAINLEVEL: NO PAIN (0)

## 2023-06-02 NOTE — PROGRESS NOTES
Infusion Nursing Note:  Anat Su presents today for 1st dose venofer 300mg.    Patient seen by provider today: No   present during visit today: Not Applicable.    Note: Patient has been on venofer many times in the past. She states she is actually feeling better this time around.      Intravenous Access:  Peripheral IV placed.    Treatment Conditions:  Not Applicable.      Post Infusion Assessment:  Patient tolerated infusion without incident.  Blood return noted pre and post infusion.  Site patent and intact, free from redness, edema or discomfort.  No evidence of extravasations.  Access discontinued per protocol.       Discharge Plan:   Patient discharged in stable condition accompanied by: self.  Departure Mode: Ambulatory.  Will return to clinic next Wednesday.    Jackie Grimes RN

## 2023-06-06 ENCOUNTER — OFFICE VISIT (OUTPATIENT)
Dept: FAMILY MEDICINE | Facility: CLINIC | Age: 48
End: 2023-06-06
Payer: COMMERCIAL

## 2023-06-06 VITALS
SYSTOLIC BLOOD PRESSURE: 127 MMHG | TEMPERATURE: 97 F | RESPIRATION RATE: 16 BRPM | HEART RATE: 72 BPM | DIASTOLIC BLOOD PRESSURE: 89 MMHG | WEIGHT: 188 LBS | HEIGHT: 62 IN | BODY MASS INDEX: 34.6 KG/M2 | OXYGEN SATURATION: 98 %

## 2023-06-06 DIAGNOSIS — R53.83 FATIGUE, UNSPECIFIED TYPE: ICD-10-CM

## 2023-06-06 DIAGNOSIS — Z12.11 COLON CANCER SCREENING: ICD-10-CM

## 2023-06-06 DIAGNOSIS — R56.9 SEIZURES (H): ICD-10-CM

## 2023-06-06 DIAGNOSIS — Z12.4 CERVICAL CANCER SCREENING: ICD-10-CM

## 2023-06-06 DIAGNOSIS — E03.8 SUBCLINICAL HYPOTHYROIDISM: Primary | ICD-10-CM

## 2023-06-06 DIAGNOSIS — F32.1 CURRENT MODERATE EPISODE OF MAJOR DEPRESSIVE DISORDER WITHOUT PRIOR EPISODE (H): ICD-10-CM

## 2023-06-06 LAB — TSH SERPL DL<=0.005 MIU/L-ACNC: 3.25 UIU/ML (ref 0.3–4.2)

## 2023-06-06 PROCEDURE — 36415 COLL VENOUS BLD VENIPUNCTURE: CPT | Performed by: FAMILY MEDICINE

## 2023-06-06 PROCEDURE — 84443 ASSAY THYROID STIM HORMONE: CPT | Performed by: FAMILY MEDICINE

## 2023-06-06 PROCEDURE — 99214 OFFICE O/P EST MOD 30 MIN: CPT | Performed by: FAMILY MEDICINE

## 2023-06-06 RX ORDER — BUPROPION HYDROCHLORIDE 150 MG/1
150 TABLET ORAL EVERY MORNING
Qty: 90 TABLET | Refills: 1 | Status: SHIPPED | OUTPATIENT
Start: 2023-06-06

## 2023-06-06 RX ORDER — HYDROXYZINE HYDROCHLORIDE 25 MG/1
25 TABLET, FILM COATED ORAL 3 TIMES DAILY PRN
Qty: 90 TABLET | Refills: 0 | Status: SHIPPED | OUTPATIENT
Start: 2023-06-06

## 2023-06-06 ASSESSMENT — PATIENT HEALTH QUESTIONNAIRE - PHQ9
SUM OF ALL RESPONSES TO PHQ QUESTIONS 1-9: 8
10. IF YOU CHECKED OFF ANY PROBLEMS, HOW DIFFICULT HAVE THESE PROBLEMS MADE IT FOR YOU TO DO YOUR WORK, TAKE CARE OF THINGS AT HOME, OR GET ALONG WITH OTHER PEOPLE: SOMEWHAT DIFFICULT
SUM OF ALL RESPONSES TO PHQ QUESTIONS 1-9: 8

## 2023-06-06 ASSESSMENT — PAIN SCALES - GENERAL: PAINLEVEL: NO PAIN (0)

## 2023-06-06 NOTE — PATIENT INSTRUCTIONS
Set up appointment with OB/GYN for Pap smear    Labs today    Stool test for colon cancer screening    Please schedule mammogram    Follow-up with psychiatry for diagnostic clarification about your condition    I sent to medication to help with your symptoms including Atarax and Wellbutrin

## 2023-06-06 NOTE — PROGRESS NOTES
Assessment & Plan     Subclinical hypothyroidism  Last H5.17, normal T4  She reports feeling tired but otherwise no other symptoms    TSH   Date Value Ref Range Status   01/20/2023 5.17 (H) 0.40 - 4.00 mU/L Final   01/26/2007 1.54 0.4 - 5.0 mU/L Final   '  Repeat TSH  - TSH with free T4 reflex; Future    Fatigue, unspecified type  Unclear etiology  Labs showed subclinical hypothyroidism but otherwise unremarkable  Current moderate episode of major depressive disorder without prior episode (H)  Has been feeling tired, lack of energy.  No suicidal ideation.  I suspect undiagnosed bipolar disorder, she has been on Atarax and Wellbutrin previously we will restart this medication, referral to adult [facility] for diagnostic clarification  - hydrOXYzine (ATARAX) 25 MG tablet; Take 1 tablet (25 mg) by mouth 3 times daily as needed for itching  - buPROPion (WELLBUTRIN XL) 150 MG 24 hr tablet; Take 1 tablet (150 mg) by mouth every morning  - [facility]  Referral; Future    Seizures (H)    Per patient present since birth   No current or recent seizure   Not on any medication   continue to monitor   Colon cancer screening    - Fecal colorectal cancer screen (FIT); Future    Cervical cancer screening  Declined Pap today  She requested referral to OB/GYN for Pap smear  - Ob/Gyn Referral; Future                 Leobardo Perales MD  [facility] JENSEN Gooden is a 48 year old, presenting for the following health issues:  Results and  Follow Up         View : No data to display.              History of Present Illness       Reason for visit:  Fall up    She eats 2-3 servings of fruits and vegetables daily.She consumes 1 sweetened beverage(s) daily.She exercises with enough effort to increase her heart rate 9 or less minutes per day.  She exercises with enough effort to increase her heart rate 3 or less days per week.   She is taking medications regularly.    Today's PHQ-9        "  PHQ-9 Total Score: 8    PHQ-9 Q9 Thoughts of better off dead/self-harm past 2 weeks :   Not at all    How difficult have these problems made it for you to do your work, take care of things at home, or get along with other people: Somewhat difficult         Wt Readings from Last 4 Encounters:   06/06/23 85.3 kg (188 lb)   01/20/23 83.9 kg (185 lb)   10/12/22 82.4 kg (181 lb 9.6 oz)   11/12/18 82.6 kg (182 lb)               Review of Systems   Constitutional, HEENT, cardiovascular, pulmonary, gi and gu systems are negative, except as otherwise noted.      Objective    /89   Pulse 72   Temp 97  F (36.1  C) (Tympanic)   Resp 16   Ht 1.564 m (5' 1.57\")   Wt 85.3 kg (188 lb)   LMP 04/05/2023 (Approximate)   SpO2 98%   BMI 34.87 kg/m    Body mass index is 34.87 kg/m .  Physical Exam  Vitals and nursing note reviewed.   Constitutional:       General: She is not in acute distress.     Appearance: Normal appearance. She is not ill-appearing, toxic-appearing or diaphoretic.   HENT:      Head: Normocephalic.   Neurological:      Mental Status: She is alert.                            "

## 2023-06-06 NOTE — LETTER
June 7, 2023      [facility]  [address]    [place] [address]        Dear Ms.Karinacole,    We are writing to inform you of your test results.    Thyroid test is normal .  Please set up PAP smear with OBGYN as we discussed.      Resulted Orders   TSH with free T4 reflex   Result Value Ref Range    TSH 3.25 0.30 - 4.20 uIU/mL       If you have any questions or concerns, please call the clinic at the number listed above.       Sincerely,      Leobardo Perales MD

## 2023-06-07 ENCOUNTER — INFUSION THERAPY VISIT (OUTPATIENT)
Dept: INFUSION THERAPY | Facility: CLINIC | Age: 48
End: 2023-06-07
Attending: STUDENT IN AN ORGANIZED HEALTH CARE EDUCATION/TRAINING PROGRAM
Payer: COMMERCIAL

## 2023-06-07 VITALS
DIASTOLIC BLOOD PRESSURE: 83 MMHG | HEART RATE: 90 BPM | TEMPERATURE: 97.9 F | SYSTOLIC BLOOD PRESSURE: 142 MMHG | OXYGEN SATURATION: 100 %

## 2023-06-07 DIAGNOSIS — D64.9 SEVERE ANEMIA: ICD-10-CM

## 2023-06-07 DIAGNOSIS — D62 ANEMIA DUE TO BLOOD LOSS, ACUTE: Primary | ICD-10-CM

## 2023-06-07 DIAGNOSIS — D25.9 UTERINE LEIOMYOMA, UNSPECIFIED LOCATION: ICD-10-CM

## 2023-06-07 PROCEDURE — 96365 THER/PROPH/DIAG IV INF INIT: CPT

## 2023-06-07 PROCEDURE — 96366 THER/PROPH/DIAG IV INF ADDON: CPT

## 2023-06-07 PROCEDURE — 258N000003 HC RX IP 258 OP 636: Performed by: STUDENT IN AN ORGANIZED HEALTH CARE EDUCATION/TRAINING PROGRAM

## 2023-06-07 PROCEDURE — 250N000011 HC RX IP 250 OP 636: Performed by: STUDENT IN AN ORGANIZED HEALTH CARE EDUCATION/TRAINING PROGRAM

## 2023-06-07 RX ORDER — HEPARIN SODIUM,PORCINE 10 UNIT/ML
5 VIAL (ML) INTRAVENOUS
Status: CANCELLED | OUTPATIENT
Start: 2023-06-08

## 2023-06-07 RX ORDER — DIPHENHYDRAMINE HYDROCHLORIDE 50 MG/ML
50 INJECTION INTRAMUSCULAR; INTRAVENOUS
Status: CANCELLED
Start: 2023-06-08

## 2023-06-07 RX ORDER — METHYLPREDNISOLONE SODIUM SUCCINATE 125 MG/2ML
125 INJECTION, POWDER, LYOPHILIZED, FOR SOLUTION INTRAMUSCULAR; INTRAVENOUS
Status: CANCELLED
Start: 2023-06-08

## 2023-06-07 RX ORDER — EPINEPHRINE 1 MG/ML
0.3 INJECTION, SOLUTION, CONCENTRATE INTRAVENOUS EVERY 5 MIN PRN
Status: CANCELLED | OUTPATIENT
Start: 2023-06-08

## 2023-06-07 RX ORDER — HEPARIN SODIUM (PORCINE) LOCK FLUSH IV SOLN 100 UNIT/ML 100 UNIT/ML
5 SOLUTION INTRAVENOUS
Status: CANCELLED | OUTPATIENT
Start: 2023-06-08

## 2023-06-07 RX ORDER — MEPERIDINE HYDROCHLORIDE 25 MG/ML
25 INJECTION INTRAMUSCULAR; INTRAVENOUS; SUBCUTANEOUS EVERY 30 MIN PRN
Status: CANCELLED | OUTPATIENT
Start: 2023-06-08

## 2023-06-07 RX ORDER — ALBUTEROL SULFATE 0.83 MG/ML
2.5 SOLUTION RESPIRATORY (INHALATION)
Status: CANCELLED | OUTPATIENT
Start: 2023-06-08

## 2023-06-07 RX ORDER — ALBUTEROL SULFATE 90 UG/1
1-2 AEROSOL, METERED RESPIRATORY (INHALATION)
Status: CANCELLED
Start: 2023-06-08

## 2023-06-07 RX ADMIN — SODIUM CHLORIDE 250 ML: 9 INJECTION, SOLUTION INTRAVENOUS at 12:12

## 2023-06-07 RX ADMIN — IRON SUCROSE 300 MG: 20 INJECTION, SOLUTION INTRAVENOUS at 12:09

## 2023-06-07 NOTE — PROGRESS NOTES
Infusion Nursing Note:  Anat Su presents today for venofer infusion.    Patient seen by provider today: No   present during visit today: Not Applicable.    Note: Not acute issues. Pt reports feeling better since her last infusion.       Intravenous Access:  Peripheral IV placed.    Treatment Conditions:  Not Applicable.      Post Infusion Assessment:  Patient tolerated infusion without incident.  Blood return noted pre and post infusion.  Site patent and intact, free from redness, edema or discomfort.  No evidence of extravasations.  Access discontinued per protocol.       Discharge Plan:   Discharge instructions reviewed with: Patient.  Patient and/or family verbalized understanding of discharge instructions and all questions answered.  Patient discharged in stable condition accompanied by: self.  Departure Mode: Ambulatory.      Cathryn Christy RN

## 2023-06-13 ENCOUNTER — INFUSION THERAPY VISIT (OUTPATIENT)
Dept: INFUSION THERAPY | Facility: CLINIC | Age: 48
End: 2023-06-13
Attending: STUDENT IN AN ORGANIZED HEALTH CARE EDUCATION/TRAINING PROGRAM
Payer: COMMERCIAL

## 2023-06-13 VITALS — SYSTOLIC BLOOD PRESSURE: 163 MMHG | TEMPERATURE: 98.1 F | HEART RATE: 78 BPM | DIASTOLIC BLOOD PRESSURE: 90 MMHG

## 2023-06-13 DIAGNOSIS — D62 ANEMIA DUE TO BLOOD LOSS, ACUTE: Primary | ICD-10-CM

## 2023-06-13 DIAGNOSIS — D64.9 SEVERE ANEMIA: ICD-10-CM

## 2023-06-13 DIAGNOSIS — D25.9 UTERINE LEIOMYOMA, UNSPECIFIED LOCATION: ICD-10-CM

## 2023-06-13 PROCEDURE — 96365 THER/PROPH/DIAG IV INF INIT: CPT

## 2023-06-13 PROCEDURE — 250N000011 HC RX IP 250 OP 636: Performed by: STUDENT IN AN ORGANIZED HEALTH CARE EDUCATION/TRAINING PROGRAM

## 2023-06-13 PROCEDURE — 96366 THER/PROPH/DIAG IV INF ADDON: CPT

## 2023-06-13 PROCEDURE — 258N000003 HC RX IP 258 OP 636: Performed by: STUDENT IN AN ORGANIZED HEALTH CARE EDUCATION/TRAINING PROGRAM

## 2023-06-13 RX ORDER — HEPARIN SODIUM (PORCINE) LOCK FLUSH IV SOLN 100 UNIT/ML 100 UNIT/ML
5 SOLUTION INTRAVENOUS
Status: CANCELLED | OUTPATIENT
Start: 2023-06-13

## 2023-06-13 RX ORDER — DIPHENHYDRAMINE HYDROCHLORIDE 50 MG/ML
50 INJECTION INTRAMUSCULAR; INTRAVENOUS
Status: CANCELLED
Start: 2023-06-13

## 2023-06-13 RX ORDER — EPINEPHRINE 1 MG/ML
0.3 INJECTION, SOLUTION, CONCENTRATE INTRAVENOUS EVERY 5 MIN PRN
Status: CANCELLED | OUTPATIENT
Start: 2023-06-13

## 2023-06-13 RX ORDER — ALBUTEROL SULFATE 0.83 MG/ML
2.5 SOLUTION RESPIRATORY (INHALATION)
Status: CANCELLED | OUTPATIENT
Start: 2023-06-13

## 2023-06-13 RX ORDER — ALBUTEROL SULFATE 90 UG/1
1-2 AEROSOL, METERED RESPIRATORY (INHALATION)
Status: CANCELLED
Start: 2023-06-13

## 2023-06-13 RX ORDER — HEPARIN SODIUM,PORCINE 10 UNIT/ML
5 VIAL (ML) INTRAVENOUS
Status: CANCELLED | OUTPATIENT
Start: 2023-06-13

## 2023-06-13 RX ORDER — MEPERIDINE HYDROCHLORIDE 25 MG/ML
25 INJECTION INTRAMUSCULAR; INTRAVENOUS; SUBCUTANEOUS EVERY 30 MIN PRN
Status: CANCELLED | OUTPATIENT
Start: 2023-06-13

## 2023-06-13 RX ORDER — METHYLPREDNISOLONE SODIUM SUCCINATE 125 MG/2ML
125 INJECTION, POWDER, LYOPHILIZED, FOR SOLUTION INTRAMUSCULAR; INTRAVENOUS
Status: CANCELLED
Start: 2023-06-13

## 2023-06-13 RX ADMIN — SODIUM CHLORIDE 250 ML: 9 INJECTION, SOLUTION INTRAVENOUS at 13:55

## 2023-06-13 RX ADMIN — IRON SUCROSE 300 MG: 20 INJECTION, SOLUTION INTRAVENOUS at 12:20

## 2023-06-13 ASSESSMENT — PAIN SCALES - GENERAL: PAINLEVEL: NO PAIN (0)

## 2023-06-13 NOTE — PROGRESS NOTES
Infusion Nursing Note:  Anat Su presents today for venofer 300 mg 3/3.    Patient seen by provider today: No   present during visit today: Not Applicable.    Note: N/A.      Intravenous Access:  Peripheral IV placed.    Treatment Conditions:  Not Applicable.      Post Infusion Assessment:  Patient tolerated infusion without incident.  Blood return noted pre and post infusion.  Site patent and intact, free from redness, edema or discomfort.  No evidence of extravasations.  Access discontinued per protocol.       Discharge Plan:   Patient discharged in stable condition accompanied by: self.  Departure Mode: Ambulatory.  No further infusions scheduled at this time.      Brianna Lares

## 2023-06-15 ENCOUNTER — LAB (OUTPATIENT)
Dept: LAB | Facility: CLINIC | Age: 48
End: 2023-06-15
Payer: COMMERCIAL

## 2023-06-15 DIAGNOSIS — D64.9 SEVERE ANEMIA: ICD-10-CM

## 2023-06-15 LAB
ANION GAP SERPL CALCULATED.3IONS-SCNC: 10 MMOL/L (ref 7–15)
BUN SERPL-MCNC: 9.5 MG/DL (ref 6–20)
CALCIUM SERPL-MCNC: 9.3 MG/DL (ref 8.6–10)
CHLORIDE SERPL-SCNC: 103 MMOL/L (ref 98–107)
CREAT SERPL-MCNC: 0.42 MG/DL (ref 0.51–0.95)
DEPRECATED CALCIDIOL+CALCIFEROL SERPL-MC: 37 UG/L (ref 20–75)
DEPRECATED HCO3 PLAS-SCNC: 25 MMOL/L (ref 22–29)
FOLATE SERPL-MCNC: 12.2 NG/ML (ref 4.6–34.8)
GFR SERPL CREATININE-BSD FRML MDRD: >90 ML/MIN/1.73M2
GLUCOSE SERPL-MCNC: 139 MG/DL (ref 70–99)
MAGNESIUM SERPL-MCNC: 1.8 MG/DL (ref 1.7–2.3)
PHOSPHATE SERPL-MCNC: 3.7 MG/DL (ref 2.5–4.5)
POTASSIUM SERPL-SCNC: 4.1 MMOL/L (ref 3.4–5.3)
SODIUM SERPL-SCNC: 138 MMOL/L (ref 136–145)
VIT B12 SERPL-MCNC: 520 PG/ML (ref 232–1245)

## 2023-06-15 PROCEDURE — 82746 ASSAY OF FOLIC ACID SERUM: CPT

## 2023-06-15 PROCEDURE — 82607 VITAMIN B-12: CPT

## 2023-06-15 PROCEDURE — 80048 BASIC METABOLIC PNL TOTAL CA: CPT

## 2023-06-15 PROCEDURE — 82306 VITAMIN D 25 HYDROXY: CPT

## 2023-06-15 PROCEDURE — 84100 ASSAY OF PHOSPHORUS: CPT

## 2023-06-15 PROCEDURE — 83735 ASSAY OF MAGNESIUM: CPT

## 2023-06-15 PROCEDURE — 36415 COLL VENOUS BLD VENIPUNCTURE: CPT

## 2023-06-22 ENCOUNTER — OFFICE VISIT (OUTPATIENT)
Dept: FAMILY MEDICINE | Facility: CLINIC | Age: 48
End: 2023-06-22
Payer: COMMERCIAL

## 2023-06-22 VITALS
HEART RATE: 82 BPM | SYSTOLIC BLOOD PRESSURE: 153 MMHG | BODY MASS INDEX: 33.16 KG/M2 | WEIGHT: 210 LBS | OXYGEN SATURATION: 100 % | DIASTOLIC BLOOD PRESSURE: 82 MMHG

## 2023-06-22 DIAGNOSIS — R52 PAIN: ICD-10-CM

## 2023-06-22 DIAGNOSIS — D25.1 INTRAMURAL LEIOMYOMA OF UTERUS: ICD-10-CM

## 2023-06-22 DIAGNOSIS — R03.0 ELEVATED BLOOD PRESSURE READING WITHOUT DIAGNOSIS OF HYPERTENSION: ICD-10-CM

## 2023-06-22 DIAGNOSIS — R79.89 LOW VITAMIN D LEVEL: ICD-10-CM

## 2023-06-22 DIAGNOSIS — Z01.818 PREOP GENERAL PHYSICAL EXAM: Primary | ICD-10-CM

## 2023-06-22 DIAGNOSIS — D64.9 SEVERE ANEMIA: ICD-10-CM

## 2023-06-22 PROCEDURE — 99214 OFFICE O/P EST MOD 30 MIN: CPT | Mod: GC | Performed by: STUDENT IN AN ORGANIZED HEALTH CARE EDUCATION/TRAINING PROGRAM

## 2023-06-22 RX ORDER — IBUPROFEN 600 MG/1
600 TABLET, FILM COATED ORAL EVERY 6 HOURS PRN
Qty: 90 TABLET | Refills: 3 | Status: SHIPPED | OUTPATIENT
Start: 2023-06-22

## 2023-06-22 RX ORDER — ACETAMINOPHEN 160 MG
1 TABLET,DISINTEGRATING ORAL DAILY
Qty: 90 CAPSULE | Refills: 3 | Status: SHIPPED | OUTPATIENT
Start: 2023-06-22

## 2023-06-22 NOTE — PROGRESS NOTES
[address]  [address] [place] [id number]  Phone: [phone]  Fax: [phone]  Primary Provider: Amanda Abernathy  Pre-op Performing Provider: AUDRA BLACKWELL    PREOPERATIVE EVALUATION:  Today's date: 6/22/2023    Anat Su is a 48 year old female who presents for a preoperative evaluation.      6/22/2023    10:47 AM   Additional Questions   Roomed by Doua   Accompanied by Self         6/22/2023    10:47 AM   Patient Reported Additional Medications   Patient reports taking the following new medications N/A     Surgical Information:    Surgery/Procedure: Abdominal Myomectomy  Surgery Location: [facility]  Surgeon: Robert Mckay MD FACOG  Surgery Date: Was planned for tomorrow, had to reschedule, so date TBD  Time of Surgery:   Where patient plans to recover: At home with family  Fax number for surgical facility: [phone]  Assessment & Plan     The proposed surgical procedure is considered INTERMEDIATE risk.    Preop general physical exam  Intramural leiomyoma of uterus  Severe Anemia  Patient here for pre-op for abdominal myomectomy, however this was cancelled due to patient's significant anemia <7. Will recheck hgb . Given most recent iron infusion was last week, may not see full effect of this infusion yet. Ideally, patient would be above 8 and may need higher hgb, but would defer to surgeon's discretion for this goal.   - CBC with Platelets and Reflex to Iron Studies  - discuss with surgeon hgb transfusion threshold and timing of surgery based on newest check    Elevated blood pressure reading without diagnosis of hypertension  Patient to follow-up in 1 week with home BP readings. Would start ACE to control BP during muna-operative stage if >160/100 definitely and consider if >140/90 with close follow-up otherwise.  - BMP last week with normal kidney function  - lipids per routine screening  - Home Blood Pressure Monitor  Order         Risks and Recommendations:  The patient has the following additional risks and recommendations for perioperative complications:  Anemia/Bleeding/Clotting:    - Significant bleeding history   - Anemia and requires treatment prior to surgery. See above.    Antiplatelet or Anticoagulation Medication Instructions:   - Patient is on no antiplatelet or anticoagulation medications. No prei-operative NSAIDs.    Additional Medication Instructions:  Patient is to take all scheduled medications on the day of surgery    RECOMMENDATION:  Surgery is NOT recommended due to severe anemia that must be corrected prior to elective surgery. Anemia treatment plan: see above.    Subjective     HPI related to upcoming procedure:         6/22/2023    11:08 AM   Preop Questions   1. Have you ever had a heart attack or stroke? No   2. Have you ever had surgery on your heart or blood vessels, such as a stent placement, a coronary artery bypass, or surgery on an artery in your head, neck, heart, or legs? No   3. Do you have chest pain with activity? No   4. Do you have a history of  heart failure? No   5. Do you currently have a cold, bronchitis or symptoms of other infection? No   6. Do you have a cough, shortness of breath, or wheezing? No   7. Do you or anyone in your family have previous history of blood clots? No   8. Do you or does anyone in your family have a serious bleeding problem such as prolonged bleeding following surgeries or cuts? No   9. Have you ever had problems with anemia or been told to take iron pills? YES - recent iron infusion   10. Have you had any abnormal blood loss such as black, tarry or bloody stools, or abnormal vaginal bleeding? No   11. Have you ever had a blood transfusion? No   12. Are you willing to have a blood transfusion if it is medically needed before, during, or after your surgery? Yes   13. Have you or any of your relatives ever had problems with anesthesia? No   14. Do you have sleep  apnea, excessive snoring or daytime drowsiness? No   15. Do you have any artifical heart valves or other implanted medical devices like a pacemaker, defibrillator, or continuous glucose monitor? No   16. Do you have artificial joints? No   17. Are you allergic to latex? No   18. Is there any chance that you may be pregnant? No     Health Care Directive:  Patient does not have a Health Care Directive or Living Will: Discussed advance care planning with patient; information given to patient to review.    Preoperative Review of :   reviewed - no record of controlled substances prescribed.      Review of Systems  CONSTITUTIONAL: NEGATIVE for fever, chills, change in weight  INTEGUMENTARY/SKIN: NEGATIVE for worrisome rashes, moles or lesions  EYES: NEGATIVE for vision changes or irritation  ENT/MOUTH: NEGATIVE for ear, mouth and throat problems  RESP: NEGATIVE for significant cough or SOB  CV: NEGATIVE for chest pain, palpitations or peripheral edema  GI: NEGATIVE for nausea, abdominal pain, heartburn, or change in bowel habits   female: heavy menses  MUSCULOSKELETAL: NEGATIVE for significant arthralgias or myalgia  NEURO: NEGATIVE for weakness, dizziness or paresthesias  ENDOCRINE: NEGATIVE for temperature intolerance, skin/hair changes  HEME: NEGATIVE for bleeding problems  PSYCHIATRIC: NEGATIVE for changes in mood or affect    Patient Active Problem List    Diagnosis Date Noted     Severe anemia 05/23/2023     Priority: Medium     Fibroid uterus 03/15/2023     Priority: Medium     Anemia due to blood loss, acute 12/14/2022     Priority: Medium      Past Medical History:   Diagnosis Date     Known health problems: none      Past Surgical History:   Procedure Laterality Date     NO HISTORY OF SURGERY       Current Outpatient Medications   Medication Sig Dispense Refill     Cholecalciferol (VITAMIN D3) 50 MCG (2000 UT) CAPS Take 1 capsule by mouth daily 90 capsule 3     ibuprofen (ADVIL/MOTRIN) 600 MG tablet Take  1 tablet (600 mg) by mouth every 6 hours as needed for moderate pain 90 tablet 3     vitamin B complex with vitamin C (STRESS TAB) tablet Take 1 tablet by mouth daily       vitamin D3 (CHOLECALCIFEROL) 50 mcg (2000 units) tablet Take 1 tablet (50 mcg) by mouth daily 90 tablet 3     ferrous sulfate (FEROSUL) 325 (65 Fe) MG tablet Take 325 mg by mouth (Patient not taking: Reported on 6/13/2023)         No Known Allergies     Social History     Tobacco Use     Smoking status: Never     Smokeless tobacco: Never   Substance Use Topics     Alcohol use: Not Currently       History   Drug Use Unknown         Objective     BP (!) 153/82 (BP Location: Right arm, Patient Position: Sitting, Cuff Size: Adult Large)   Pulse 82   Wt 95.3 kg (210 lb)   SpO2 100%   BMI 33.16 kg/m      Physical Exam    GENERAL APPEARANCE: healthy, alert and no distress     EYES: EOMI, PERRL     HENT: ear canals and TM's normal and nose and mouth without ulcers or lesions     NECK: no adenopathy, no asymmetry, masses, or scars and thyroid normal to palpation     RESP: lungs clear to auscultation - no rales, rhonchi or wheezes     CV: regular rates and rhythm, normal S1 S2, no S3 or S4 and no murmur, click or rub     ABDOMEN:  soft, nontender, no HSM or masses and bowel sounds normal     MS: extremities normal- no gross deformities noted, no evidence of inflammation in joints, FROM in all extremities.     SKIN: no suspicious lesions or rashes     NEURO: Normal strength and tone, sensory exam grossly normal, mentation intact and speech normal     PSYCH: mentation appears normal. and affect normal/bright     LYMPHATICS: No cervical adenopathy    Recent Labs   Lab Test 06/15/23  1053 05/18/23  1115 03/28/23  1104 01/12/23  1054 12/29/22  1217 12/29/22  1155   HGB  --  6.7* 5.5*   < >  --  6.2*   PLT  --  243 369   < >  --  479*     --   --   --   --  135*   POTASSIUM 4.1  --   --   --   --  4.9   CR 0.42*  --   --   --   --  0.43*   A1C  --   --    --   --  4.7  --     < > = values in this interval not displayed.        Diagnostics:  Labs pending at this time.  Results will be reviewed when available.   No EKG required, no history of coronary heart disease, significant arrhythmia, peripheral arterial disease or other structural heart disease.    Revised Cardiac Risk Index (RCRI):  The patient has the following serious cardiovascular risks for perioperative complications:   - No serious cardiac risks = 0 points     RCRI Interpretation: 0 points: Class I (very low risk - 0.4% complication rate)           Signed Electronically by: Chemo Quach MD  Copy of this evaluation report is provided to requesting physician.        DME (Durable Medical Equipment) Orders and Documentation  Orders Placed This Encounter   Procedures     Home Blood Pressure Monitor Order      The patient was assessed and it was determined the patient is in need of the following listed DME Supplies/Equipment. Please complete supporting documentation below to demonstrate medical necessity.      DME All Other Item(s) Documentation    List reason for need and supporting documentation for medical necessity below for each DME item.     1. Elevated blood pressure without diagnosis of hypertension.

## 2023-06-22 NOTE — Clinical Note
MADY. I just saw this patient for pre-op but could not give approval. They cancelled the surgery for tomorrow. Will post-pone until hgb is improved.   Also BP was a little high today, so I recommended she come back next week to review home BP numbers and discuss in-person hgb goal/ plan once we have this recheck.  Letting you know so it doesn't get lost in the shuffle of graduation.   Let me know if there is anything else I should have her do in the meantime.  Thank you, Chemo

## 2023-06-22 NOTE — PATIENT INSTRUCTIONS
Come back in 1 week for blood-work (make sure this is at least 1-2 weeks before surgery). Make an appointment to see another doctor to review the blood work the day after this.    Start taking blood pressures in the morning at home every day. If the top number is above 140 or the bottom is above 90, please make an appointment to see a doctor at \[facility]\"" before your surgery.    If you blood pressure or hemoglobin (anemia) are not controlled, we will have to wait longer for your surgery.        [employer] Locations:   For blood pressure cuff if not at pharmacy     Business Hours:  Monday - Friday 8am - 4:30pm  Visit [email or web address]     [facility] at [address], [address]  [place] [address]  [phone]  Monday - Friday, 8 a.m. to 4:30 p.m.

## 2023-07-06 ENCOUNTER — LAB (OUTPATIENT)
Dept: LAB | Facility: CLINIC | Age: 48
End: 2023-07-06
Payer: COMMERCIAL

## 2023-07-06 DIAGNOSIS — D25.1 INTRAMURAL LEIOMYOMA OF UTERUS: ICD-10-CM

## 2023-07-06 DIAGNOSIS — R03.0 ELEVATED BLOOD PRESSURE READING WITHOUT DIAGNOSIS OF HYPERTENSION: ICD-10-CM

## 2023-07-06 LAB
CHOLEST SERPL-MCNC: 187 MG/DL
ERYTHROCYTE [DISTWIDTH] IN BLOOD BY AUTOMATED COUNT: 28.7 % (ref 10–15)
FERRITIN SERPL-MCNC: 30 NG/ML (ref 6–175)
HCT VFR BLD AUTO: 36.7 % (ref 35–47)
HDLC SERPL-MCNC: 42 MG/DL
HGB BLD-MCNC: 10.4 G/DL (ref 11.7–15.7)
HOLD SPECIMEN: NORMAL
IRON BINDING CAPACITY (ROCHE): 348 UG/DL (ref 240–430)
IRON SATN MFR SERPL: 9 % (ref 15–46)
IRON SERPL-MCNC: 32 UG/DL (ref 37–145)
LDLC SERPL CALC-MCNC: 118 MG/DL
MCH RBC QN AUTO: 21 PG (ref 26.5–33)
MCHC RBC AUTO-ENTMCNC: 28.3 G/DL (ref 31.5–36.5)
MCV RBC AUTO: 74 FL (ref 78–100)
NONHDLC SERPL-MCNC: 145 MG/DL
PLAT MORPH BLD: NORMAL
PLATELET # BLD AUTO: 300 10E3/UL (ref 150–450)
RBC # BLD AUTO: 4.96 10E6/UL (ref 3.8–5.2)
RBC MORPH BLD: NORMAL
TRIGL SERPL-MCNC: 134 MG/DL
WBC # BLD AUTO: 3.5 10E3/UL (ref 4–11)

## 2023-07-06 PROCEDURE — 85027 COMPLETE CBC AUTOMATED: CPT

## 2023-07-06 PROCEDURE — 83540 ASSAY OF IRON: CPT

## 2023-07-06 PROCEDURE — 36415 COLL VENOUS BLD VENIPUNCTURE: CPT

## 2023-07-06 PROCEDURE — 80061 LIPID PANEL: CPT

## 2023-07-06 PROCEDURE — 83550 IRON BINDING TEST: CPT

## 2023-07-06 PROCEDURE — 82728 ASSAY OF FERRITIN: CPT

## 2023-07-13 ENCOUNTER — TELEPHONE (OUTPATIENT)
Dept: FAMILY MEDICINE | Facility: CLINIC | Age: 48
End: 2023-07-13
Payer: COMMERCIAL

## 2023-07-13 NOTE — TELEPHONE ENCOUNTER
----- Message from Amanda Abernathy DO sent at 7/13/2023  8:23 AM CDT -----  Please call patient to discuss test results. Her hemoglobin is now above 10! This is the level it needs to be to plan for surgery. Please call her to let her know and ask if she would like our assistance in rescheduling her prior fibroid surgery and make a visit at [facility] for another pre-op (last one her hemoglobin was too low and so we couldn't proceed with surgery). Amanda Abernathy DO

## 2023-07-13 NOTE — TELEPHONE ENCOUNTER
"RN spoke to the patient and relay the message below ,patient requested pre op lab papers and stated that \"I will sign a release form and leave in  then  later today\".    Socorro Johnson RN    "

## 2023-07-26 NOTE — TELEPHONE ENCOUNTER
FUTURE VISIT INFORMATION      SURGERY INFORMATION:  Date: 8/10/23 Fibroid Dr hernández//Bill Chan     RECORDS REQUESTED FROM:       Primary Care Provider: Amanda Abernathy DO - janel

## 2023-07-28 ENCOUNTER — PRE VISIT (OUTPATIENT)
Dept: SURGERY | Facility: CLINIC | Age: 48
End: 2023-07-28

## 2023-07-28 ENCOUNTER — OFFICE VISIT (OUTPATIENT)
Dept: SURGERY | Facility: CLINIC | Age: 48
End: 2023-07-28
Payer: COMMERCIAL

## 2023-07-28 ENCOUNTER — ANESTHESIA EVENT (OUTPATIENT)
Dept: SURGERY | Facility: CLINIC | Age: 48
End: 2023-07-28

## 2023-07-28 VITALS
SYSTOLIC BLOOD PRESSURE: 163 MMHG | TEMPERATURE: 98.3 F | BODY MASS INDEX: 33.75 KG/M2 | RESPIRATION RATE: 16 BRPM | HEART RATE: 87 BPM | WEIGHT: 210 LBS | HEIGHT: 66 IN | DIASTOLIC BLOOD PRESSURE: 98 MMHG | OXYGEN SATURATION: 100 %

## 2023-07-28 DIAGNOSIS — D50.0 BLOOD LOSS ANEMIA: ICD-10-CM

## 2023-07-28 DIAGNOSIS — N93.9 ABNORMAL UTERINE BLEEDING (AUB): ICD-10-CM

## 2023-07-28 DIAGNOSIS — D25.9 UTERINE LEIOMYOMA, UNSPECIFIED LOCATION: ICD-10-CM

## 2023-07-28 DIAGNOSIS — Z01.818 PREOP EXAMINATION: Primary | ICD-10-CM

## 2023-07-28 PROCEDURE — 99204 OFFICE O/P NEW MOD 45 MIN: CPT | Performed by: PHYSICIAN ASSISTANT

## 2023-07-28 ASSESSMENT — ENCOUNTER SYMPTOMS: SEIZURES: 0

## 2023-07-28 ASSESSMENT — LIFESTYLE VARIABLES: TOBACCO_USE: 0

## 2023-07-28 ASSESSMENT — PAIN SCALES - GENERAL: PAINLEVEL: NO PAIN (0)

## 2023-07-28 NOTE — H&P
Pre-Operative H & P     CC:  Preoperative exam to assess for increased cardiopulmonary risk while undergoing surgery and anesthesia.    Date of Encounter: 7/28/2023  Primary Care Physician:  Amanda Abernathy     Reason for visit:   Encounter Diagnoses   Name Primary?    Abnormal uterine bleeding (AUB) Yes    Blood loss anemia     Uterine leiomyoma, unspecified location     Preop examination        HPI  Anat Su is a 48 year old female who presents for pre-operative H & P in preparation for  Procedure Information       Date/Time: 8/10/23     Procedure: TBD    Anesthesia type: TBD    Pre-op diagnosis: AUB, blood loss anemia, uterine fibroid    Location: [facility]    Providers: Dr. Mckay            Patient is being evaluated for comorbid conditions of obesity.     Ms. Su has a history of abnormal uterine bleeding in the setting of uterine fibroids. She has had severe blood loss anemia requiring iron infusions from December - April 2023. Her goal Hgb preoperatively is >10 per chart review. 8/10/ now presents for the above procedure.      History is obtained from the patient and chart review    Hx of abnormal bleeding or anti-platelet use: denies    Menstrual history: Patient's last menstrual period was 07/17/2023 (exact date).:       Past Medical History  Past Medical History:   Diagnosis Date    Abnormal uterine bleeding (AUB)     Blood loss anemia     Fibroid uterus        Past Surgical History  Past Surgical History:   Procedure Laterality Date    NO HISTORY OF SURGERY         Prior to Admission Medications  Current Outpatient Medications   Medication Sig Dispense Refill    Cholecalciferol (VITAMIN D3) 50 MCG (2000 UT) CAPS Take 1 capsule by mouth daily 90 capsule 3    ibuprofen (ADVIL/MOTRIN) 600 MG tablet Take 1 tablet (600 mg) by mouth every 6 hours as needed for moderate pain 90 tablet 3    ferrous sulfate (FEROSUL) 325 (65 Fe) MG tablet Take 325 mg  by mouth (Patient not taking: Reported on 6/13/2023)      vitamin B complex with vitamin C (STRESS TAB) tablet Take 1 tablet by mouth daily (Patient not taking: Reported on 7/28/2023)      vitamin D3 (CHOLECALCIFEROL) 50 mcg (2000 units) tablet Take 1 tablet (50 mcg) by mouth daily 90 tablet 3       Allergies  No Known Allergies    Social History  Social History     Socioeconomic History    Marital status: Single     Spouse name: Not on file    Number of children: Not on file    Years of education: Not on file    Highest education level: Not on file   Occupational History    Not on file   Tobacco Use    Smoking status: Never    Smokeless tobacco: Never   Substance and Sexual Activity    Alcohol use: Never    Drug use: Never    Sexual activity: Never   Other Topics Concern    Not on file   Social History Narrative    12/14/22 moved to \[facility]\"" to be with her sister and receive health care. Unable to work due to severe anemia.     Katy Bear MD     Social Determinants of Health     Financial Resource Strain: Not on file   Food Insecurity: Not on file   Transportation Needs: Not on file   Physical Activity: Not on file   Stress: Not on file   Social Connections: Not on file   Intimate Partner Violence: Not on file   Housing Stability: Not on file       Family History  Family History   Problem Relation Age of Onset    Anesthesia Reaction No family hx of     Deep Vein Thrombosis (DVT) No family hx of        Review of Systems  The complete review of systems is negative other than noted in the HPI or here.     Anesthesia Evaluation   Pt has not had prior anesthetic         ROS/MED HX  ENT/Pulmonary:  - neg pulmonary ROS  (-) tobacco use, asthma and sleep apnea   Neurologic:  - neg neurologic ROS  (-) no seizures and no CVA   Cardiovascular:  - neg cardiovascular ROS     METS/Exercise Tolerance: >4 METS    Hematologic: Comments: s/p IV iron sucrose 12/15/22 thru 4/12/23.     (+)      anemia,          Musculoskeletal:   "- neg musculoskeletal ROS     GI/Hepatic:  - neg GI/hepatic ROS  (-) GERD and liver disease   Renal/Genitourinary:  - neg Renal ROS  (-) renal disease   Endo:  - neg endo ROS  (-) Type II DM   Psychiatric/Substance Use:  - neg psychiatric ROS     Infectious Disease:  - neg infectious disease ROS     Malignancy:  - neg malignancy ROS     Other:  - neg other ROS          BP (!) 163/98 (BP Location: Left arm, Patient Position: Sitting, Cuff Size: Adult Regular)   Pulse 87   Temp 98.3  F (36.8  C) (Oral)   Resp 16   Ht 1.676 m (5' 6\")   Wt 95.3 kg (210 lb)   LMP 07/17/2023 (Exact Date)   SpO2 100%   Breastfeeding No   BMI 33.89 kg/m      Physical Exam  Constitutional: Awake, alert, cooperative, no apparent distress, and appears stated age.  Eyes: Pupils equal, round and reactive to light, extra ocular muscles intact, sclera clear, conjunctiva normal.  HENT: Normocephalic, oral pharynx with moist mucus membranes, good dentition. No goiter appreciated. No removable dental hardware.  Respiratory: Clear to auscultation bilaterally, no crackles or wheezing. No SOB when supine.  Cardiovascular: Regular rate and rhythm, normal S1 and S2, and no murmur noted.  Carotids +2, no bruits. No edema. Palpable pulses to radial, DP and PT arteries.   GI: Normal bowel sounds, soft, distended, non-tender, no masses palpated.    Lymph/Hematologic: No cervical lymphadenopathy and no supraclavicular lymphadenopathy.  Genitourinary:  deferred  Skin: Warm and dry.  No rashes.   Musculoskeletal: full ROM of neck. There is no redness, warmth, or swelling of the joints. Gross motor strength is normal.    Neurologic: Awake, alert, oriented to name, place and time. Cranial nerves II-XII are grossly intact. Gait is normal. Ambulates from chair to exam table, seats self, lies supine and sits back up w/o assistance.  Neuropsychiatric: Calm, cooperative. Normal affect. Pleasant. Answers questions appropriately, follows commands w/o " "difficulty.        PRIOR LABS/DIAGNOSTIC STUDIES:    All labs and imaging personally reviewed      The patient's records and results personally reviewed by this provider.     Outside records reviewed from: Care Everywhere      Assessment    Anat Su is a 48 year old female seen as a PAC referral for risk assessment and optimization for anesthesia.    Plan/Recommendations  Pt will be optimized for the proposed procedure.  See below for details on the assessment, risk, and preoperative recommendations    NEUROLOGY  - No history of TIA, CVA or seizure    -Post Op delirium risk factors:  No risk identified    ENT  - No current airway concerns.  Will need to be reassessed day of surgery.  Mallampati: II  TM: > 3    CARDIAC  - No history of CAD, Hypertension, and Afib  - METS (Metabolic Equivalents)  Patient performs 4 or more METS exercise without symptoms            Total Score: 0      RCRI-Very low risk: Class 1 0.4% complication rate            Total Score: 0        PULMONARY  AMOS Low Risk            Total Score: 0      - Denies asthma or inhaler use  - Tobacco History    History   Smoking Status    Never   Smokeless Tobacco    Never       GI  - Denies GERD  PONV High Risk  Total Score: 3           1 AN PONV: Pt is Female    1 AN PONV: Patient is not a current smoker    1 AN PONV: Intended Post Op Opioids        /RENAL  - Uterine fibroid    ENDOCRINE    - BMI: Estimated body mass index is 33.89 kg/m  as calculated from the following:    Height as of this encounter: 1.676 m (5' 6\").    Weight as of this encounter: 95.3 kg (210 lb).  Obesity (BMI >30)  - No history of Diabetes Mellitus    HEME  VTE Low Risk 0.26%            Total Score: 0      - No history of abnormal bleeding or antiplatelet use.  - Blood loss anemia in setting of uterine fibroid. S/P multiple iron infusions. Hgb on 7/6/23 was 10.4 (up from 6.7 in May). Will recheck on DOS.      The patient is aware that the final anesthesia plan will be " decided by the assigned anesthesia provider on the date of service.      The patient is optimized for their procedure. AVS with information on surgery time/arrival time, meds and NPO status given by nursing staff. No further diagnostic testing indicated.      On the day of service:     Prep time: 12 minutes  Visit time: 22 minutes  Documentation time: 11 minutes  ------------------------------------------  Total time: 45 minutes      Nicole Guevara PA-C  Preoperative Assessment Center  [facility]  Clinic and Surgery Center  Phone: [phone]  Fax: [phone]

## 2023-07-28 NOTE — PATIENT INSTRUCTIONS
Preparing for Your Surgery      Name:  Anat Su   MRN:  [id number]   :  1975   Today's Date:  2023       Arriving for surgery:  Surgery is not scheduled at this time. You will be called 1-2 days prior to surgery by preadmission Nursing, [phone] with Date, Time and Location of surgery.    Please come to:     Please come to:      [facility] [address]  [address] [place]  [address]  The Green Ramp for patients and visitors is located beneath the [facility]. The parking facility entrance is at the intersection of [address] [place] [address] and [address]. Patients and visitors who self-park will receive the reduced hospital parking rate (no ticket validation needed).  [employer] parking, located at the [facility] main entrance on [address] [place] [address], is available Monday - Friday from 7 am to 3:30 pm.  Discounted parking pass options can be purchased from  attendants during business hours.  -Check in at the security desk in the [facility] ([facility]) Lobby. They will direct you to the correct elevators.  -Proceed to the 3rd floor, check in at the Adult Surgery Waiting Lounge. [phone]  If you are in need of directions, a wheelchair or escort please stop at the Information Desk in the lobby.  Inform the information person that you are here for surgery; a wheelchair and escort to [address] will be provided.   An escort to the Adult Surgery Waiting Lounge will be provided.    What can I eat or drink?  -  You may eat and drink normally up to 8 hours prior to arrival time.   -  You may have clear liquids until 2 hours prior to arrival time.     Examples of clear liquids:  Water  Clear broth  Juices (apple, white grape, white cranberry  and cider) without pulp  Noncarbonated, powder based beverages  (lemonade and Ramirez-Aid)  Sodas (Sprite, 7-Up, ginger ale and  charlee)  Coffee or tea (without milk or cream)  Gatorade    -  No Alcohol or cannabis products for at least 24 hours before surgery.     Which medicines can I take?    Hold Aspirin for 7 days before surgery.   Hold Multivitamins for 7 days before surgery.  Hold Supplements for 7 days before surgery.  Hold Ibuprofen (Advil, Motrin) for 1 day(s) before surgery--unless otherwise directed by surgeon.  Hold Naproxen (Aleve) for 4 days before surgery.    -  DO NOT take these medications the day of surgery:    Vitamin D3      How do I prepare myself?  - Please take 2 showers (one the night prior to surgery and one the morning of surgery) using Scrubcare or Hibiclens soap.    Use this soap only from the neck to your toes.     Leave the soap on your skin for one minute--then rinse thoroughly.      You may use your own shampoo and conditioner. No other hair products.   - Please remove all jewelry and body piercings.  - No lotions, deodorants or fragrance.  - No makeup or fingernail polish.   - Bring your ID and insurance card.    -If you have a Deep Brain Stimulator, Spinal Cord Stimulator, or any Neuro Stimulator device---you must bring the remote control to the hospital.      ALL PATIENTS GOING HOME THE SAME DAY OF SURGERY ARE REQUIRED TO HAVE A RESPONSIBLE ADULT TO DRIVE AND BE IN ATTENDANCE WITH THEM FOR 24 HOURS FOLLOWING SURGERY.    Covid testing policy as of 12/06/2022  Your surgeon will notify and schedule you for a COVID test if one is needed before surgery--please direct any questions or COVID symptoms to your surgeon      Questions or Concerns:    - For any questions regarding the day of surgery or your hospital stay, please contact the Pre Admission Nursing Office at [phone].       - If you have health changes between today and your surgery, please call your surgeon.       - For questions after surgery, please call your surgeons office.           Current Visitor Guidelines    You may have 2 visitors in the  pre op area.    Visiting hours: 8 a.m. to 8:30 p.m.    You may have four visitors during your inpatient hospital stay.    Patients confirmed or suspected to have symptoms of COVID 19 or flu:     No visitors allowed for adult patients.   Children (under age 18) can have 1 named visitor.     People who are sick or showing symptoms of COVID 19 or flu:    Are not allowed to visit patients--we can only make exceptions in special situations.       Please follow these guidelines for your visit:          Please maintain social distance          Masking is optional--however at times you may be asked to wear a mask for the safety of yourself and others     Clean your hands with alcohol hand . Do this when you arrive at and leave the building and patient room,    And again after you touch your mask or anything in the room.     Go directly to and from the room you are visiting.     Stay in the patient s room during your visit. Limit going to other places in the hospital as much as possible     Leave bags and jackets at home or in the car.     For everyone s health, please don t come and go during your visit. That includes for smoking   during your visit.

## 2023-08-07 ENCOUNTER — HOSPITAL ENCOUNTER (INPATIENT)
Facility: HOSPITAL | Age: 48
Setting detail: SURGERY ADMIT
End: 2023-08-07
Attending: OBSTETRICS & GYNECOLOGY | Admitting: OBSTETRICS & GYNECOLOGY
Payer: COMMERCIAL

## 2023-11-02 ENCOUNTER — OFFICE VISIT (OUTPATIENT)
Dept: FAMILY MEDICINE | Facility: CLINIC | Age: 48
End: 2023-11-02
Payer: COMMERCIAL

## 2023-11-02 VITALS
HEART RATE: 77 BPM | WEIGHT: 181.6 LBS | OXYGEN SATURATION: 99 % | TEMPERATURE: 97.8 F | BODY MASS INDEX: 33.42 KG/M2 | HEIGHT: 62 IN | DIASTOLIC BLOOD PRESSURE: 74 MMHG | SYSTOLIC BLOOD PRESSURE: 110 MMHG | RESPIRATION RATE: 16 BRPM

## 2023-11-02 DIAGNOSIS — Z71.6 ENCOUNTER FOR SMOKING CESSATION COUNSELING: ICD-10-CM

## 2023-11-02 DIAGNOSIS — N64.4 BREAST PAIN, LEFT: ICD-10-CM

## 2023-11-02 DIAGNOSIS — R19.00 PELVIC MASS: ICD-10-CM

## 2023-11-02 DIAGNOSIS — R10.13 EPIGASTRIC PAIN: Primary | ICD-10-CM

## 2023-11-02 DIAGNOSIS — F41.9 ANXIETY: ICD-10-CM

## 2023-11-02 LAB
ALBUMIN SERPL BCG-MCNC: 3.9 G/DL (ref 3.5–5.2)
ALBUMIN UR-MCNC: NEGATIVE MG/DL
ALP SERPL-CCNC: 85 U/L (ref 35–104)
ALT SERPL W P-5'-P-CCNC: 23 U/L (ref 0–50)
ANION GAP SERPL CALCULATED.3IONS-SCNC: 9 MMOL/L (ref 7–15)
APPEARANCE UR: CLEAR
AST SERPL W P-5'-P-CCNC: 32 U/L (ref 0–45)
BACTERIA #/AREA URNS HPF: ABNORMAL /HPF
BILIRUB SERPL-MCNC: 0.2 MG/DL
BILIRUB UR QL STRIP: NEGATIVE
BUN SERPL-MCNC: 10.2 MG/DL (ref 6–20)
CALCIUM SERPL-MCNC: 9 MG/DL (ref 8.6–10)
CHLORIDE SERPL-SCNC: 106 MMOL/L (ref 98–107)
COLOR UR AUTO: YELLOW
CREAT SERPL-MCNC: 0.48 MG/DL (ref 0.51–0.95)
DEPRECATED HCO3 PLAS-SCNC: 25 MMOL/L (ref 22–29)
EGFRCR SERPLBLD CKD-EPI 2021: >90 ML/MIN/1.73M2
ERYTHROCYTE [DISTWIDTH] IN BLOOD BY AUTOMATED COUNT: 13.6 % (ref 10–15)
GLUCOSE SERPL-MCNC: 93 MG/DL (ref 70–99)
GLUCOSE UR STRIP-MCNC: NEGATIVE MG/DL
HCG UR QL: NEGATIVE
HCT VFR BLD AUTO: 42.4 % (ref 35–47)
HGB BLD-MCNC: 13.5 G/DL (ref 11.7–15.7)
HGB UR QL STRIP: ABNORMAL
KETONES UR STRIP-MCNC: NEGATIVE MG/DL
LEUKOCYTE ESTERASE UR QL STRIP: NEGATIVE
LIPASE SERPL-CCNC: 32 U/L (ref 13–60)
MCH RBC QN AUTO: 29.7 PG (ref 26.5–33)
MCHC RBC AUTO-ENTMCNC: 31.8 G/DL (ref 31.5–36.5)
MCV RBC AUTO: 93 FL (ref 78–100)
NITRATE UR QL: NEGATIVE
PH UR STRIP: 6 [PH] (ref 5–7)
PLATELET # BLD AUTO: 254 10E3/UL (ref 150–450)
POTASSIUM SERPL-SCNC: 3.9 MMOL/L (ref 3.4–5.3)
PROT SERPL-MCNC: 7 G/DL (ref 6.4–8.3)
RBC # BLD AUTO: 4.55 10E6/UL (ref 3.8–5.2)
RBC #/AREA URNS AUTO: ABNORMAL /HPF
SODIUM SERPL-SCNC: 140 MMOL/L (ref 135–145)
SP GR UR STRIP: 1.02 (ref 1–1.03)
SQUAMOUS #/AREA URNS AUTO: ABNORMAL /LPF
UROBILINOGEN UR STRIP-ACNC: 0.2 E.U./DL
WBC # BLD AUTO: 5.1 10E3/UL (ref 4–11)
WBC #/AREA URNS AUTO: ABNORMAL /HPF

## 2023-11-02 PROCEDURE — 99215 OFFICE O/P EST HI 40 MIN: CPT | Performed by: PHYSICIAN ASSISTANT

## 2023-11-02 PROCEDURE — 81025 URINE PREGNANCY TEST: CPT | Performed by: PHYSICIAN ASSISTANT

## 2023-11-02 PROCEDURE — 81001 URINALYSIS AUTO W/SCOPE: CPT | Performed by: PHYSICIAN ASSISTANT

## 2023-11-02 PROCEDURE — 83690 ASSAY OF LIPASE: CPT | Performed by: PHYSICIAN ASSISTANT

## 2023-11-02 PROCEDURE — 85027 COMPLETE CBC AUTOMATED: CPT | Performed by: PHYSICIAN ASSISTANT

## 2023-11-02 PROCEDURE — 36415 COLL VENOUS BLD VENIPUNCTURE: CPT | Performed by: PHYSICIAN ASSISTANT

## 2023-11-02 PROCEDURE — 80053 COMPREHEN METABOLIC PANEL: CPT | Performed by: PHYSICIAN ASSISTANT

## 2023-11-02 RX ORDER — BUPROPION HYDROCHLORIDE 300 MG/1
300 TABLET ORAL EVERY MORNING
Qty: 90 TABLET | Refills: 0 | Status: SHIPPED | OUTPATIENT
Start: 2023-11-02 | End: 2024-01-31

## 2023-11-02 RX ORDER — FAMOTIDINE 20 MG/1
20 TABLET, FILM COATED ORAL 2 TIMES DAILY
Qty: 120 TABLET | Refills: 0 | Status: SHIPPED | OUTPATIENT
Start: 2023-11-02 | End: 2023-11-07

## 2023-11-02 RX ORDER — HYDROXYZINE HYDROCHLORIDE 25 MG/1
25-50 TABLET, FILM COATED ORAL EVERY 8 HOURS PRN
Qty: 60 TABLET | Refills: 0 | Status: SHIPPED | OUTPATIENT
Start: 2023-11-02

## 2023-11-02 RX ORDER — BUPROPION HYDROCHLORIDE 150 MG/1
TABLET ORAL
Qty: 174 TABLET | Refills: 0 | Status: CANCELLED | OUTPATIENT
Start: 2023-11-02 | End: 2023-11-08

## 2023-11-02 RX ORDER — BUPROPION HYDROCHLORIDE 150 MG/1
150 TABLET ORAL EVERY MORNING
Qty: 3 TABLET | Refills: 0 | Status: SHIPPED | OUTPATIENT
Start: 2023-11-02 | End: 2023-11-05

## 2023-11-02 ASSESSMENT — PATIENT HEALTH QUESTIONNAIRE - PHQ9
SUM OF ALL RESPONSES TO PHQ QUESTIONS 1-9: 8
SUM OF ALL RESPONSES TO PHQ QUESTIONS 1-9: 8
10. IF YOU CHECKED OFF ANY PROBLEMS, HOW DIFFICULT HAVE THESE PROBLEMS MADE IT FOR YOU TO DO YOUR WORK, TAKE CARE OF THINGS AT HOME, OR GET ALONG WITH OTHER PEOPLE: SOMEWHAT DIFFICULT

## 2023-11-02 ASSESSMENT — PAIN SCALES - GENERAL: PAINLEVEL: EXTREME PAIN (8)

## 2023-11-02 NOTE — COMMUNITY RESOURCES LIST (ENGLISH)
11/02/2023   [facility] Resources  N/A  For questions about this resource list or additional care needs, please contact your primary care clinic or care manager.  Phone: [phone]   Email: N/A   Address: [address] [place] [address]   Hours: N/A        Financial Stability       Utility payment assistance  1  [facility] Distance: 2.45 miles      In-Person   [address] [place] [address]  Language: English, Turkmen  Hours: Mon - Fri 8:00 AM - 3:30 PM  Fees: Free, Self Pay   Phone: (720) 704-1730 Email: [email or web address] Website: [email or web address]     2  [facility] Distance: 2.7 miles      Phone/Virtual   [address] [place] [address]  Language: English  Hours: Mon - Fri 8:30 AM - 12:00 PM , Mon - Fri 1:00 PM - 4:00 PM  Fees: Free   Phone: (611) 495-4786 Email: [email or web address] Website: [email or web address]          Important Numbers & Websites       Emergency Services   911  [facility]   311  Poison Control   (606) 931-7379  Suicide Prevention Lifeline   (781) 238-4042 (TALK)  Child Abuse Hotline   (344) 803-9220 (4-A-Child)  Sexual Assault Hotline   (361) 599-2131 (HOPE)  National Runaway Safeline   (402) 548-5392 (RUNAWAY)  All-Options Talkline   (350) 718-6770  Substance Abuse Referral   (548) 535-1214 (HELP)

## 2023-11-02 NOTE — LETTER
November 7, 2023      Berkleyrafal Connelly  [address]    [place] [address]        Dear ,    We are writing to inform you of your test results.    Your test results fall within the expected range(s) or remain unchanged from previous results.  Please discontinue taking the famotidine and instead begin a course of omeprazole that I sent to your pharmacy. If your symptoms are continuing, please see a primary care provider. If you worsen/change, please be seen again in urgent care or the ER.    Resulted Orders   CBC with platelets   Result Value Ref Range    WBC Count 5.1 4.0 - 11.0 10e3/uL    RBC Count 4.55 3.80 - 5.20 10e6/uL    Hemoglobin 13.5 11.7 - 15.7 g/dL    Hematocrit 42.4 35.0 - 47.0 %    MCV 93 78 - 100 fL    MCH 29.7 26.5 - 33.0 pg    MCHC 31.8 31.5 - 36.5 g/dL    RDW 13.6 10.0 - 15.0 %    Platelet Count 254 150 - 450 10e3/uL   Comprehensive metabolic panel (BMP + Alb, Alk Phos, ALT, AST, Total. Bili, TP)   Result Value Ref Range    Sodium 140 135 - 145 mmol/L      Comment:      Reference intervals for this test were updated on 09/26/2023 to more accurately reflect our healthy population. There may be differences in the flagging of prior results with similar values performed with this method. Interpretation of those prior results can be made in the context of the updated reference intervals.     Potassium 3.9 3.4 - 5.3 mmol/L    Carbon Dioxide (CO2) 25 22 - 29 mmol/L    Anion Gap 9 7 - 15 mmol/L    Urea Nitrogen 10.2 6.0 - 20.0 mg/dL    Creatinine 0.48 (L) 0.51 - 0.95 mg/dL    GFR Estimate >90 >60 mL/min/1.73m2    Calcium 9.0 8.6 - 10.0 mg/dL    Chloride 106 98 - 107 mmol/L    Glucose 93 70 - 99 mg/dL    Alkaline Phosphatase 85 35 - 104 U/L    AST 32 0 - 45 U/L      Comment:      Reference intervals for this test were updated on 6/12/2023 to more accurately reflect our healthy population. There may be differences in the flagging of prior results with similar values performed with this  method. Interpretation of those prior results can be made in the context of the updated reference intervals.    ALT 23 0 - 50 U/L      Comment:      Reference intervals for this test were updated on 6/12/2023 to more accurately reflect our healthy population. There may be differences in the flagging of prior results with similar values performed with this method. Interpretation of those prior results can be made in the context of the updated reference intervals.      Protein Total 7.0 6.4 - 8.3 g/dL    Albumin 3.9 3.5 - 5.2 g/dL    Bilirubin Total 0.2 <=1.2 mg/dL   Lipase   Result Value Ref Range    Lipase 32 13 - 60 U/L   UA Macroscopic with reflex to Microscopic and Culture - Lab Collect   Result Value Ref Range    Color Urine Yellow Colorless, Straw, Light Yellow, Yellow    Appearance Urine Clear Clear    Glucose Urine Negative Negative mg/dL    Bilirubin Urine Negative Negative    Ketones Urine Negative Negative mg/dL    Specific Gravity Urine 1.025 1.003 - 1.035    Blood Urine Trace (A) Negative    pH Urine 6.0 5.0 - 7.0    Protein Albumin Urine Negative Negative mg/dL    Urobilinogen Urine 0.2 0.2, 1.0 E.U./dL    Nitrite Urine Negative Negative    Leukocyte Esterase Urine Negative Negative   HCG Qual, Urine ([id number])   Result Value Ref Range    hCG Urine Qualitative Negative Negative      Comment:      This test is for screening purposes.  Results should be interpreted along with the clinical picture.  Confirmation testing is available if warranted by ordering [id number], HCG Quantitative Pregnancy.   Helicobacter pylori Antigen Stool   Result Value Ref Range    Helicobacter pylori Antigen Stool Negative Negative      Comment:      Negative for Helicobacter pylori antigen by enzyme immunoassay. A negative result indicates the absence of H. pylori antigen or that the level of antigen is below the level of detection.   UA Microscopic with Reflex to Culture   Result Value Ref Range    Bacteria Urine Few (A) None Seen  /HPF    RBC Urine 0-2 0-2 /HPF /HPF    WBC Urine 0-5 0-5 /HPF /HPF    Squamous Epithelials Urine Few (A) None Seen /LPF    Narrative    Urine Culture not indicated       If you have any questions or concerns, please call the clinic at the number listed above.       Sincerely,      DHARA Graham

## 2023-11-02 NOTE — PROGRESS NOTES
Assessment & Plan   Problem List Items Addressed This Visit    None  Visit Diagnoses       Epigastric pain    -  Primary    Relevant Medications    famotidine (PEPCID) 20 MG tablet    Other Relevant Orders    CBC with platelets (Completed)    Comprehensive metabolic panel (BMP + Alb, Alk Phos, ALT, AST, Total. Bili, TP)    Lipase    UA Macroscopic with reflex to Microscopic and Culture - Lab Collect (Completed)    HCG Qual, Urine ([id number]) (Completed)    Helicobacter pylori Antigen Stool    UA Microscopic with Reflex to Culture (Completed)    Breast pain, left        Relevant Orders    MA Diagnostic Digital Left    US Breast Left Complete 4 Quadrants    Encounter for smoking cessation counseling        Relevant Medications    buPROPion (WELLBUTRIN XL) 150 MG 24 hr tablet    buPROPion (WELLBUTRIN XL) 300 MG 24 hr tablet    Anxiety        Relevant Medications    hydrOXYzine (ATARAX) 25 MG tablet    buPROPion (WELLBUTRIN XL) 150 MG 24 hr tablet    buPROPion (WELLBUTRIN XL) 300 MG 24 hr tablet    Pelvic mass        Relevant Orders    Ob/Gyn  Referral           Story suspicion for gastritis vs PUD, possibly from H pylori. CT reviewed from 6/2023. Benign abd exam today. UA and CBC not concerning. Other labs pending. Low suspicion for appendicitis, cholecystitis, or other worrisome abdominopelvic process at this point. Breast exam not concerning for acute process. Mammo and US ordered. Will trial the above for anxiety and tobacco cessation. OBGYN consult for pelvic mass concerns, which also sound chronic.    Complete history and physical exam as below. Afebrile with normal vital signs.    DDx and Dx discussed with and explained to the pt to their satisfaction.  All questions were answered at this time. Pt expressed understanding of and agreement with this dx, tx, and plan. No further workup warranted and standard medication warnings given. I have given the patient a list of pertinent indications for  "re-evaluation. Will go to the Emergency Department if symptoms worsen or new concerning symptoms arise. Patient left in no apparent distress.     Ordering of each unique test  Prescription drug management  40 minutes spent by me on the date of the encounter doing chart review, history and exam, documentation and further activities per the note     BMI:   Estimated body mass index is 33.68 kg/m  as calculated from the following:    Height as of this encounter: 1.564 m (5' 1.58\").    Weight as of this encounter: 82.4 kg (181 lb 9.6 oz).     See Patient Instructions    DHARA Graham  [facility]    Subjective Answers submitted by the patient for this visit:  Patient Health Questionnaire (Submitted on 11/2/2023)  If you checked off any problems, how difficult have these problems made it for you to do your work, take care of things at home, or get along with other people?: Somewhat difficult  PHQ9 TOTAL SCORE: 8  General Questionnaire (Submitted on 11/2/2023)  Chief Complaint: Chronic problems general questions HPI Form  How many servings of fruits and vegetables do you eat daily?: 2-3  On average, how many sweetened beverages do you drink each day (Examples: soda, juice, sweet tea, etc.  Do NOT count diet or artificially sweetened beverages)?: 2  How many days a week do you exercise enough to make your heart beat faster?: 3 or less  How many days per week do you miss taking your medication?: 1  What makes it hard for you to take your medication every day?: other  General Concern (Submitted on 11/2/2023)  Chief Complaint: Chronic problems general questions HPI Form  What is the reason for your visit today?: stomach pain  When did your symptoms begin?: More than a month  What are your symptoms?: my stomuch  How would you describe these symptoms?: Severe  Are your symptoms:: Worsening  Have you had these symptoms before?: Yes  Have you tried or received treatment for these symptoms before?: " No  Is there anything that makes you feel worse?: wheni eat  Is there anything that makes you feel better?: nothing    Berkley is a 48 year old, presenting for the following health issues:  Abdominal Pain        11/2/2023    11:07 AM   Additional Questions   Roomed by Glenda   Accompanied by Self         11/2/2023    11:07 AM   Patient Reported Additional Medications   Patient reports taking the following new medications N/A       History of Present Illness       Reason for visit:  Stomach pain  Symptom onset:  More than a month  Symptoms include:  My stomuch  Symptom intensity:  Severe  Symptom progression:  Worsening  Had these symptoms before:  Yes  Has tried/received treatment for these symptoms:  No  What makes it worse:  Wheni eat  What makes it better:  Nothing    She eats 2-3 servings of fruits and vegetables daily.She consumes 2 sweetened beverage(s) daily. She exercises with enough effort to increase her heart rate 3 or less days per week. She is missing 1 dose(s) of medications per week.  She is not taking prescribed medications regularly due to other.     Acute Illness  Acute illness concerns:  epigastric Pain  Onset/Duration: x 4+ mo  Symptoms:  Fever: YES  Chills/Sweats: YES  Headache (location?): YES  Sinus Pressure: YES  Conjunctivitis:  no  Ear Pain: no  Rhinorrhea: No  Congestion: No  Sore Throat: No  Cough: no  Wheeze: No  Decreased Appetite: YES  Nausea: YES  Vomiting: YES  Diarrhea: YES  Dysuria/Freq.: No  Dysuria or Hematuria: No  Fatigue/Achiness: No  Sick/Strep Exposure: No  Therapies tried and outcome: None  Worse with coffee. Vomiting once daily post prandially. No melena or bloody stool. Smokes cigarettes. Interested in meds to help w quitting. Tried Wellbutrin for 3 days previously. Tolerated well.     Left breast pain for over a year. Has not had a mammogram for many years. 10/5/23 was LMP. No nipple discharge. No axillary pain. Feels a lump, but this has been there for many years.  "    Has concern for tumors in the pelvic region.    Review of Systems   Constitutional, HEENT, cardiovascular, pulmonary, gi and gu systems are negative, except as otherwise noted.      Objective    /74   Pulse 77   Temp 97.8  F (36.6  C) (Tympanic)   Resp 16   Ht 1.564 m (5' 1.58\")   Wt 82.4 kg (181 lb 9.6 oz)   LMP 10/28/2023 (Approximate)   SpO2 99%   BMI 33.68 kg/m    Body mass index is 33.68 kg/m .  Physical Exam  Vitals and nursing note reviewed. Exam conducted with a chaperone present ([facility] Amanda Lugo).   Constitutional:       General: She is not in acute distress.     Appearance: She is not ill-appearing or diaphoretic.   HENT:      Head: Normocephalic and atraumatic.      Mouth/Throat:      Mouth: Mucous membranes are moist.   Eyes:      Conjunctiva/sclera: Conjunctivae normal.   Cardiovascular:      Rate and Rhythm: Normal rate and regular rhythm.      Heart sounds: Normal heart sounds. No murmur heard.     No friction rub. No gallop.   Pulmonary:      Effort: Pulmonary effort is normal. No respiratory distress.      Breath sounds: Normal breath sounds. No stridor. No wheezing, rhonchi or rales.   Abdominal:      General: Bowel sounds are normal. There is no distension.      Palpations: Abdomen is soft. There is no mass.      Tenderness: There is no abdominal tenderness. There is no right CVA tenderness, left CVA tenderness, guarding or rebound.      Hernia: No hernia is present.   Musculoskeletal:      Comments: Left breast: no nipple discharge. ~1cm lump palpable in the 2:00 position to and ~10cm from the nipple.  No overlying signs of trauma or infection. No axillary adenopathy.    Skin:     General: Skin is warm and dry.   Neurological:      General: No focal deficit present.      Mental Status: She is alert. Mental status is at baseline.   Psychiatric:         Mood and Affect: Mood normal.         Behavior: Behavior normal.          Results for orders placed or performed in visit on " 11/02/23   CBC with platelets     Status: Normal   Result Value Ref Range    WBC Count 5.1 4.0 - 11.0 10e3/uL    RBC Count 4.55 3.80 - 5.20 10e6/uL    Hemoglobin 13.5 11.7 - 15.7 g/dL    Hematocrit 42.4 35.0 - 47.0 %    MCV 93 78 - 100 fL    MCH 29.7 26.5 - 33.0 pg    MCHC 31.8 31.5 - 36.5 g/dL    RDW 13.6 10.0 - 15.0 %    Platelet Count 254 150 - 450 10e3/uL   UA Macroscopic with reflex to Microscopic and Culture - Lab Collect     Status: Abnormal    Specimen: Urine, Midstream   Result Value Ref Range    Color Urine Yellow Colorless, Straw, Light Yellow, Yellow    Appearance Urine Clear Clear    Glucose Urine Negative Negative mg/dL    Bilirubin Urine Negative Negative    Ketones Urine Negative Negative mg/dL    Specific Gravity Urine 1.025 1.003 - 1.035    Blood Urine Trace (A) Negative    pH Urine 6.0 5.0 - 7.0    Protein Albumin Urine Negative Negative mg/dL    Urobilinogen Urine 0.2 0.2, 1.0 E.U./dL    Nitrite Urine Negative Negative    Leukocyte Esterase Urine Negative Negative   HCG Qual, Urine ([id number])     Status: Normal   Result Value Ref Range    hCG Urine Qualitative Negative Negative   UA Microscopic with Reflex to Culture     Status: Abnormal   Result Value Ref Range    Bacteria Urine Few (A) None Seen /HPF    RBC Urine 0-2 0-2 /HPF /HPF    WBC Urine 0-5 0-5 /HPF /HPF    Squamous Epithelials Urine Few (A) None Seen /LPF    Narrative    Urine Culture not indicated

## 2023-11-02 NOTE — LETTER
November 3, 2023      Berkley Connelly  [address]    [place] [address]        Dear ,    We are writing to inform you of your test results.    Your test results fall within the expected range(s) or remain unchanged from previous results.  Please continue with current treatment plan to give us a stool sample, get your ultrasound and mammogram done and see an OBGYN.    Resulted Orders   CBC with platelets   Result Value Ref Range    WBC Count 5.1 4.0 - 11.0 10e3/uL    RBC Count 4.55 3.80 - 5.20 10e6/uL    Hemoglobin 13.5 11.7 - 15.7 g/dL    Hematocrit 42.4 35.0 - 47.0 %    MCV 93 78 - 100 fL    MCH 29.7 26.5 - 33.0 pg    MCHC 31.8 31.5 - 36.5 g/dL    RDW 13.6 10.0 - 15.0 %    Platelet Count 254 150 - 450 10e3/uL   Comprehensive metabolic panel (BMP + Alb, Alk Phos, ALT, AST, Total. Bili, TP)   Result Value Ref Range    Sodium 140 135 - 145 mmol/L      Comment:      Reference intervals for this test were updated on 09/26/2023 to more accurately reflect our healthy population. There may be differences in the flagging of prior results with similar values performed with this method. Interpretation of those prior results can be made in the context of the updated reference intervals.     Potassium 3.9 3.4 - 5.3 mmol/L    Carbon Dioxide (CO2) 25 22 - 29 mmol/L    Anion Gap 9 7 - 15 mmol/L    Urea Nitrogen 10.2 6.0 - 20.0 mg/dL    Creatinine 0.48 (L) 0.51 - 0.95 mg/dL    GFR Estimate >90 >60 mL/min/1.73m2    Calcium 9.0 8.6 - 10.0 mg/dL    Chloride 106 98 - 107 mmol/L    Glucose 93 70 - 99 mg/dL    Alkaline Phosphatase 85 35 - 104 U/L    AST 32 0 - 45 U/L      Comment:      Reference intervals for this test were updated on 6/12/2023 to more accurately reflect our healthy population. There may be differences in the flagging of prior results with similar values performed with this method. Interpretation of those prior results can be made in the context of the updated reference intervals.    ALT 23 0  - 50 U/L      Comment:      Reference intervals for this test were updated on 6/12/2023 to more accurately reflect our healthy population. There may be differences in the flagging of prior results with similar values performed with this method. Interpretation of those prior results can be made in the context of the updated reference intervals.      Protein Total 7.0 6.4 - 8.3 g/dL    Albumin 3.9 3.5 - 5.2 g/dL    Bilirubin Total 0.2 <=1.2 mg/dL   Lipase   Result Value Ref Range    Lipase 32 13 - 60 U/L   UA Macroscopic with reflex to Microscopic and Culture - Lab Collect   Result Value Ref Range    Color Urine Yellow Colorless, Straw, Light Yellow, Yellow    Appearance Urine Clear Clear    Glucose Urine Negative Negative mg/dL    Bilirubin Urine Negative Negative    Ketones Urine Negative Negative mg/dL    Specific Gravity Urine 1.025 1.003 - 1.035    Blood Urine Trace (A) Negative    pH Urine 6.0 5.0 - 7.0    Protein Albumin Urine Negative Negative mg/dL    Urobilinogen Urine 0.2 0.2, 1.0 E.U./dL    Nitrite Urine Negative Negative    Leukocyte Esterase Urine Negative Negative   HCG Qual, Urine ([id number])   Result Value Ref Range    hCG Urine Qualitative Negative Negative      Comment:      This test is for screening purposes.  Results should be interpreted along with the clinical picture.  Confirmation testing is available if warranted by ordering [id number], HCG Quantitative Pregnancy.   UA Microscopic with Reflex to Culture   Result Value Ref Range    Bacteria Urine Few (A) None Seen /HPF    RBC Urine 0-2 0-2 /HPF /HPF    WBC Urine 0-5 0-5 /HPF /HPF    Squamous Epithelials Urine Few (A) None Seen /LPF    Narrative    Urine Culture not indicated       If you have any questions or concerns, please call the clinic at the number listed above.       Sincerely,      DHARA Graham

## 2023-11-02 NOTE — LETTER
November 2, 2023      Berkley Connelly  [address]    [place] [address]        To Whom It May Concern:    Berkley Connelly  was seen on 11/2/23.  Please excuse her  until 11/6/23 due to illness.        Sincerely,        DHARA Graham

## 2023-11-02 NOTE — PATIENT INSTRUCTIONS
Jim Gooden,    Thank you for allowing [facility] to manage your care.    I am unsure of the cause of your symptoms, but we will see what our workup shows.     If you develop worsening/changing symptoms at any time, please be seen in clinic/urgent care or call 911/go to the emergency department for evaluation as we discussed.    I sent your prescriptions to your pharmacy.    I ordered a breast ultrasound and mammogram. Please call diagnostic imaging [phone] to schedule your test.    I made a referral to OBGYN. They will be calling in approximately 1 week to set up your appointment.  If you do not hear from them, please call the specialty number on your after visit summary.     Please allow 1-2 business days for our office to contact you in regards to your laboratory/radiological studies.  If not done so, I encourage you to login into [email or web address] ([email or web address] to review your results as well.     If you have any questions or concerns, please feel free to call us at [phone]    Sincerely,    Reji Holland PA-C    Did you know?      You can schedule a video visit for follow-up appointments as well as future appointments for certain conditions.  Please see the below link.     [email or web address]    If you have not already done so,  I encourage you to sign up for [employer] ([email or web address]).  This will allow you to review your results, securely communicate with a provider, and schedule virtual visits as well.

## 2023-11-06 PROCEDURE — 87338 HPYLORI STOOL AG IA: CPT | Performed by: PHYSICIAN ASSISTANT

## 2023-11-07 LAB — H PYLORI AG STL QL IA: NEGATIVE

## 2023-11-21 ENCOUNTER — OFFICE VISIT (OUTPATIENT)
Dept: OBGYN | Facility: CLINIC | Age: 48
End: 2023-11-21
Attending: OBSTETRICS & GYNECOLOGY
Payer: COMMERCIAL

## 2023-11-21 VITALS
HEART RATE: 74 BPM | DIASTOLIC BLOOD PRESSURE: 85 MMHG | OXYGEN SATURATION: 100 % | SYSTOLIC BLOOD PRESSURE: 122 MMHG | WEIGHT: 185.4 LBS | BODY MASS INDEX: 34.38 KG/M2

## 2023-11-21 DIAGNOSIS — R19.00 PELVIC MASS: ICD-10-CM

## 2023-11-21 DIAGNOSIS — Z12.4 PAP SMEAR FOR CERVICAL CANCER SCREENING: ICD-10-CM

## 2023-11-21 DIAGNOSIS — N90.89 VULVAR LESION: Primary | ICD-10-CM

## 2023-11-21 PROCEDURE — 99203 OFFICE O/P NEW LOW 30 MIN: CPT | Mod: 25 | Performed by: OBSTETRICS & GYNECOLOGY

## 2023-11-21 PROCEDURE — 88341 IMHCHEM/IMCYTCHM EA ADD ANTB: CPT | Performed by: PATHOLOGY

## 2023-11-21 PROCEDURE — 87624 HPV HI-RISK TYP POOLED RSLT: CPT | Performed by: OBSTETRICS & GYNECOLOGY

## 2023-11-21 PROCEDURE — 88305 TISSUE EXAM BY PATHOLOGIST: CPT | Performed by: PATHOLOGY

## 2023-11-21 PROCEDURE — 88342 IMHCHEM/IMCYTCHM 1ST ANTB: CPT | Performed by: PATHOLOGY

## 2023-11-21 PROCEDURE — 56605 BIOPSY OF VULVA/PERINEUM: CPT | Performed by: OBSTETRICS & GYNECOLOGY

## 2023-11-21 PROCEDURE — G0145 SCR C/V CYTO,THINLAYER,RESCR: HCPCS | Performed by: OBSTETRICS & GYNECOLOGY
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
## 2023-11-27 LAB
BKR LAB AP GYN ADEQUACY: NORMAL
BKR LAB AP GYN INTERPRETATION: NORMAL
BKR LAB AP HPV REFLEX: NORMAL
BKR LAB AP LMP: NORMAL
BKR LAB AP PREVIOUS ABNORMAL: NORMAL
PATH REPORT.COMMENTS IMP SPEC: NORMAL
PATH REPORT.FINAL DX SPEC: NORMAL
PATH REPORT.GROSS SPEC: NORMAL
PATH REPORT.MICROSCOPIC SPEC OTHER STN: NORMAL
PATH REPORT.RELEVANT HX SPEC: NORMAL
PATH REPORT.RELEVANT HX SPEC: NORMAL
PHOTO IMAGE: NORMAL

## 2023-11-29 LAB
HUMAN PAPILLOMA VIRUS 16 DNA: NEGATIVE
HUMAN PAPILLOMA VIRUS 18 DNA: NEGATIVE
HUMAN PAPILLOMA VIRUS FINAL DIAGNOSIS: NORMAL
HUMAN PAPILLOMA VIRUS OTHER HR: NEGATIVE

## 2025-05-20 NOTE — PATIENT INSTRUCTIONS
If you have any questions regarding your visit, Please contact your care team.    To Schedule an Appointment 24/7  Call: [phone]  [facility]  TELEPHONE NUMBER   Stephan Mcnamara M.D.    [place]-Medical Assistant    [place]-Surgery Scheduler  [place]- Tuesday-[place]                   7:30 a.m.-3:30 p.m.  Wednesday-[place]             8:00 a.m.-4:30 p.m.  Thursday-[place]     8:00 a.m.-4:00 p.m.  Friday-[place]                       7:30 a.m.-1:00 p.m. [facility]  [id number] [place] [address].  [place] [address] [phone] Phone  [phone] Fax    Imaging Scheduling all locations  [phone]      [facility]  [address]  [place], [place] [address] [phone]    [facility]  [address] [place] [address] [phone] ask for Women's Clinic     **Surgeries** Our Surgery Schedulers will contact you to schedule. If you do not receive a call within 3 business days, please call [phone].    Urgent Care locations:  [facility] Monday-Friday                 10 am - 8 pm  Saturday and Sunday        9 am - 5 pm   [phone]   If you need a medication refill, please contact your pharmacy. Please allow 3 business days for your refill to be completed.  As always, Thank you for trusting us with your healthcare needs!  If you have any questions regarding your visit, Please contact your care team.    [facility]: [phone]    To Schedule an Appointment 24/7  Call: [phone]    see additional instructions from your care team below

## 2025-05-21 ENCOUNTER — OFFICE VISIT (OUTPATIENT)
Dept: OBGYN | Facility: CLINIC | Age: 50
End: 2025-05-21

## 2025-05-21 VITALS
WEIGHT: 202 LBS | BODY MASS INDEX: 37.46 KG/M2 | DIASTOLIC BLOOD PRESSURE: 83 MMHG | OXYGEN SATURATION: 98 % | HEART RATE: 85 BPM | SYSTOLIC BLOOD PRESSURE: 113 MMHG

## 2025-05-21 DIAGNOSIS — N89.8 VAGINAL ODOR: ICD-10-CM

## 2025-05-21 DIAGNOSIS — N92.4 ABNORMAL PERIMENOPAUSAL BLEEDING: Primary | ICD-10-CM

## 2025-05-21 DIAGNOSIS — R30.0 DYSURIA: Primary | ICD-10-CM

## 2025-05-21 LAB
ALBUMIN UR-MCNC: NEGATIVE MG/DL
APPEARANCE UR: CLEAR
BACTERIA #/AREA URNS HPF: ABNORMAL /HPF
BILIRUB UR QL STRIP: NEGATIVE
CLUE CELLS: ABNORMAL
COLOR UR AUTO: YELLOW
GLUCOSE UR STRIP-MCNC: NEGATIVE MG/DL
HGB UR QL STRIP: ABNORMAL
KETONES UR STRIP-MCNC: ABNORMAL MG/DL
LEUKOCYTE ESTERASE UR QL STRIP: NEGATIVE
NITRATE UR QL: NEGATIVE
PH UR STRIP: 6 [PH] (ref 5–7)
RBC #/AREA URNS AUTO: ABNORMAL /HPF
SP GR UR STRIP: 1.02 (ref 1–1.03)
SQUAMOUS #/AREA URNS AUTO: ABNORMAL /LPF
TRICHOMONAS, WET PREP: ABNORMAL
UROBILINOGEN UR STRIP-ACNC: 0.2 E.U./DL
WBC #/AREA URNS AUTO: ABNORMAL /HPF
WBC'S/HIGH POWER FIELD, WET PREP: ABNORMAL
YEAST, WET PREP: ABNORMAL

## 2025-05-21 PROCEDURE — 87210 SMEAR WET MOUNT SALINE/INK: CPT | Performed by: OBSTETRICS & GYNECOLOGY

## 2025-05-21 PROCEDURE — 81001 URINALYSIS AUTO W/SCOPE: CPT | Performed by: OBSTETRICS & GYNECOLOGY

## 2025-05-21 PROCEDURE — 99213 OFFICE O/P EST LOW 20 MIN: CPT | Performed by: OBSTETRICS & GYNECOLOGY
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
## 2025-05-22 ENCOUNTER — RESULTS FOLLOW-UP (OUTPATIENT)
Dept: OBGYN | Facility: CLINIC | Age: 50
End: 2025-05-22

## 2025-05-31 ENCOUNTER — HEALTH MAINTENANCE LETTER (OUTPATIENT)
Age: 50
End: 2025-05-31

## 2025-07-28 ENCOUNTER — NURSE TRIAGE (OUTPATIENT)
Dept: NURSING | Facility: CLINIC | Age: 50
End: 2025-07-28

## 2025-07-28 NOTE — TELEPHONE ENCOUNTER
S-(situation):   [employer] [place] [facility]    B-(background):   No PCP, unknown cardiac history    A-(assessment):   2 weeks patient reports (fast pumping heart rate).  Today reports chest pain and pressure.  Left Breast Swollen and painful.    R-(recommendations):   Advised ED.    Patient agrees to seek medical care at ED.    Odalys Riley RN, BSN  [facility]        Reason for Disposition   Chest pain lasting longer than 5 minutes and occurred in last 3 days (72 hours) (Exception: Feels exactly the same as previously diagnosed heartburn and has accompanying sour taste in mouth.)    Additional Information   Negative: SEVERE chest pain   Negative: Pain also in shoulder(s) or arm(s) or jaw   Negative: Difficulty breathing   Negative: Cocaine use within last 3 days   Negative: Major surgery in the past month   Negative: Hip or leg fracture (broken bone) in past month (or had cast on leg or ankle in past month)   Negative: Illness requiring prolonged bedrest in past month (e.g., immobilization, long hospital stay)   Negative: Long-distance travel in past month (e.g., car, bus, train, plane; with trip lasting 6 or more hours)   Negative: History of prior 'blood clot' in leg or lungs (i.e., deep vein thrombosis, pulmonary embolism)   Negative: History of inherited increased risk of blood clots (e.g., Factor 5 Leiden, Anti-thrombin 3, Protein C or Protein S deficiency, Prothrombin mutation)   Negative: Cancer treatment in the past two months (or has cancer now)   Negative: Heart beating irregularly or very rapidly   Negative: Followed an injury to chest   Negative: SEVERE difficulty breathing (e.g., struggling for each breath, speaks in single words)   Negative: Difficult to awaken or acting confused (e.g., disoriented, slurred speech)   Negative: Shock suspected (e.g., cold/pale/clammy skin, too weak to stand, low BP, rapid pulse)   Negative: Passed out (i.e., lost consciousness, collapsed and was not  responding)   Negative: Chest pain lasting longer than 5 minutes and ANY of the following:         Pain is crushing, pressure-like, or heavy         Over 44 years old          Over 30 years old and one cardiac risk factor (e.g diabetes, high blood pressure, high cholesterol, smoker, or family history of heart disease)         History of heart disease (e.g. angina, heart attack, heart failure, bypass surgery, takes nitroglycerin)   Negative: Heart beating < 50 beats per minute OR > 140 beats per minute   Negative: Visible sweat on face or sweat dripping down face   Negative: Sounds like a life-threatening emergency to the triager    Protocols used: Chest Pain-A-OH

## 2025-08-18 ENCOUNTER — OFFICE VISIT (OUTPATIENT)
Dept: FAMILY MEDICINE | Facility: CLINIC | Age: 50
End: 2025-08-18
Payer: COMMERCIAL

## 2025-08-18 VITALS
OXYGEN SATURATION: 99 % | BODY MASS INDEX: 37.19 KG/M2 | SYSTOLIC BLOOD PRESSURE: 120 MMHG | HEIGHT: 62 IN | TEMPERATURE: 97 F | RESPIRATION RATE: 18 BRPM | DIASTOLIC BLOOD PRESSURE: 84 MMHG | HEART RATE: 90 BPM | WEIGHT: 202.13 LBS

## 2025-08-18 DIAGNOSIS — Z87.891 PERSONAL HISTORY OF TOBACCO USE, PRESENTING HAZARDS TO HEALTH: ICD-10-CM

## 2025-08-18 DIAGNOSIS — J20.9 ACUTE BRONCHITIS, UNSPECIFIED ORGANISM: ICD-10-CM

## 2025-08-18 DIAGNOSIS — R53.83 FATIGUE, UNSPECIFIED TYPE: ICD-10-CM

## 2025-08-18 DIAGNOSIS — J45.909 MODERATE ASTHMA, UNSPECIFIED WHETHER COMPLICATED, UNSPECIFIED WHETHER PERSISTENT: Primary | ICD-10-CM

## 2025-08-18 DIAGNOSIS — N64.4 BREAST PAIN, LEFT: ICD-10-CM

## 2025-08-18 DIAGNOSIS — Z13.1 SCREENING FOR DIABETES MELLITUS: ICD-10-CM

## 2025-08-18 DIAGNOSIS — E66.9 OBESITY, UNSPECIFIED CLASS, UNSPECIFIED OBESITY TYPE, UNSPECIFIED WHETHER SERIOUS COMORBIDITY PRESENT: ICD-10-CM

## 2025-08-18 DIAGNOSIS — D64.9 ANEMIA, UNSPECIFIED TYPE: ICD-10-CM

## 2025-08-18 LAB
BASOPHILS # BLD AUTO: <0.04 10E3/UL (ref 0–0.2)
BASOPHILS NFR BLD AUTO: 0.3 %
EOSINOPHIL # BLD AUTO: <0.04 10E3/UL (ref 0–0.7)
EOSINOPHIL NFR BLD AUTO: 0.1 %
ERYTHROCYTE [DISTWIDTH] IN BLOOD BY AUTOMATED COUNT: 14.4 % (ref 10–15)
EST. AVERAGE GLUCOSE BLD GHB EST-MCNC: 126 MG/DL
HBA1C MFR BLD: 6 % (ref 0–5.6)
HCT VFR BLD AUTO: 41.6 % (ref 35–47)
HGB BLD-MCNC: 12.2 G/DL (ref 11.7–15.7)
IMM GRANULOCYTES # BLD: <0.04 10E3/UL
IMM GRANULOCYTES NFR BLD: 0.1 %
LYMPHOCYTES # BLD AUTO: 1.07 10E3/UL (ref 0.8–5.3)
LYMPHOCYTES NFR BLD AUTO: 13.7 %
MCH RBC QN AUTO: 26.5 PG (ref 26.5–33)
MCHC RBC AUTO-ENTMCNC: 29.3 G/DL (ref 31.5–36.5)
MCV RBC AUTO: 90.4 FL (ref 78–100)
MONOCYTES # BLD AUTO: 0.12 10E3/UL (ref 0–1.3)
MONOCYTES NFR BLD AUTO: 1.5 %
NEUTROPHILS # BLD AUTO: 6.58 10E3/UL (ref 1.6–8.3)
NEUTROPHILS NFR BLD AUTO: 84.3 %
PLATELET # BLD AUTO: 286 10E3/UL (ref 150–450)
RBC # BLD AUTO: 4.6 10E6/UL (ref 3.8–5.2)
WBC # BLD AUTO: 7.81 10E3/UL (ref 4–11)

## 2025-08-18 PROCEDURE — 84443 ASSAY THYROID STIM HORMONE: CPT | Performed by: FAMILY MEDICINE

## 2025-08-18 PROCEDURE — 80053 COMPREHEN METABOLIC PANEL: CPT | Performed by: FAMILY MEDICINE

## 2025-08-18 PROCEDURE — 83550 IRON BINDING TEST: CPT | Performed by: FAMILY MEDICINE

## 2025-08-18 PROCEDURE — 83036 HEMOGLOBIN GLYCOSYLATED A1C: CPT | Performed by: FAMILY MEDICINE

## 2025-08-18 PROCEDURE — 85025 COMPLETE CBC W/AUTO DIFF WBC: CPT | Performed by: FAMILY MEDICINE

## 2025-08-18 PROCEDURE — 36415 COLL VENOUS BLD VENIPUNCTURE: CPT | Performed by: FAMILY MEDICINE

## 2025-08-18 PROCEDURE — 99214 OFFICE O/P EST MOD 30 MIN: CPT | Performed by: FAMILY MEDICINE

## 2025-08-18 PROCEDURE — 82728 ASSAY OF FERRITIN: CPT | Performed by: FAMILY MEDICINE

## 2025-08-18 PROCEDURE — 83540 ASSAY OF IRON: CPT | Performed by: FAMILY MEDICINE

## 2025-08-18 PROCEDURE — 1125F AMNT PAIN NOTED PAIN PRSNT: CPT | Performed by: FAMILY MEDICINE

## 2025-08-18 PROCEDURE — 3079F DIAST BP 80-89 MM HG: CPT | Performed by: FAMILY MEDICINE

## 2025-08-18 PROCEDURE — 82746 ASSAY OF FOLIC ACID SERUM: CPT | Performed by: FAMILY MEDICINE

## 2025-08-18 PROCEDURE — 3074F SYST BP LT 130 MM HG: CPT | Performed by: FAMILY MEDICINE

## 2025-08-18 PROCEDURE — 82607 VITAMIN B-12: CPT | Performed by: FAMILY MEDICINE

## 2025-08-18 RX ORDER — ALBUTEROL SULFATE 90 UG/1
2 INHALANT RESPIRATORY (INHALATION) EVERY 6 HOURS PRN
Qty: 18 G | Refills: 5 | Status: SHIPPED | OUTPATIENT
Start: 2025-08-18

## 2025-08-18 RX ORDER — AZITHROMYCIN 250 MG/1
TABLET, FILM COATED ORAL
Qty: 6 TABLET | Refills: 0 | Status: SHIPPED | OUTPATIENT
Start: 2025-08-18 | End: 2025-08-23

## 2025-08-18 RX ORDER — BUPROPION HYDROCHLORIDE 150 MG/1
150 TABLET, EXTENDED RELEASE ORAL 2 TIMES DAILY
Qty: 60 TABLET | Refills: 1 | Status: SHIPPED | OUTPATIENT
Start: 2025-08-18

## 2025-08-18 RX ORDER — FLUTICASONE PROPIONATE 220 UG/1
1 AEROSOL, METERED RESPIRATORY (INHALATION) 2 TIMES DAILY
Qty: 12 G | Refills: 5 | Status: SHIPPED | OUTPATIENT
Start: 2025-08-18

## 2025-08-18 ASSESSMENT — PAIN SCALES - GENERAL: PAINLEVEL_OUTOF10: MILD PAIN (3)

## 2025-08-18 ASSESSMENT — PATIENT HEALTH QUESTIONNAIRE - PHQ9
SUM OF ALL RESPONSES TO PHQ QUESTIONS 1-9: 14
10. IF YOU CHECKED OFF ANY PROBLEMS, HOW DIFFICULT HAVE THESE PROBLEMS MADE IT FOR YOU TO DO YOUR WORK, TAKE CARE OF THINGS AT HOME, OR GET ALONG WITH OTHER PEOPLE: VERY DIFFICULT
SUM OF ALL RESPONSES TO PHQ QUESTIONS 1-9: 14

## 2025-08-19 LAB
ALBUMIN SERPL BCG-MCNC: 4 G/DL (ref 3.5–5.2)
ALP SERPL-CCNC: 100 U/L (ref 40–150)
ALT SERPL W P-5'-P-CCNC: 33 U/L (ref 0–50)
ANION GAP SERPL CALCULATED.3IONS-SCNC: 12 MMOL/L (ref 7–15)
AST SERPL W P-5'-P-CCNC: 43 U/L (ref 0–45)
BILIRUB SERPL-MCNC: 0.3 MG/DL
BUN SERPL-MCNC: 9.1 MG/DL (ref 6–20)
CALCIUM SERPL-MCNC: 9.3 MG/DL (ref 8.8–10.4)
CHLORIDE SERPL-SCNC: 103 MMOL/L (ref 98–107)
CREAT SERPL-MCNC: 0.59 MG/DL (ref 0.51–0.95)
EGFRCR SERPLBLD CKD-EPI 2021: >90 ML/MIN/1.73M2
FERRITIN SERPL-MCNC: 46 NG/ML (ref 6–175)
FOLATE SERPL-MCNC: 8.6 NG/ML (ref 4.6–34.8)
GLUCOSE SERPL-MCNC: 194 MG/DL (ref 70–99)
HCO3 SERPL-SCNC: 24 MMOL/L (ref 22–29)
IRON BINDING CAPACITY (ROCHE): 297 UG/DL (ref 240–430)
IRON SATN MFR SERPL: 9 % (ref 15–46)
IRON SERPL-MCNC: 26 UG/DL (ref 37–145)
POTASSIUM SERPL-SCNC: 4.6 MMOL/L (ref 3.4–5.3)
PROT SERPL-MCNC: 7.3 G/DL (ref 6.4–8.3)
SODIUM SERPL-SCNC: 139 MMOL/L (ref 135–145)
TSH SERPL DL<=0.005 MIU/L-ACNC: 0.68 UIU/ML (ref 0.3–4.2)
VIT B12 SERPL-MCNC: 717 PG/ML (ref 232–1245)

## 2025-08-21 ENCOUNTER — PATIENT OUTREACH (OUTPATIENT)
Dept: FAMILY MEDICINE | Facility: CLINIC | Age: 50
End: 2025-08-21
Payer: COMMERCIAL

## 2025-08-25 ENCOUNTER — ANCILLARY PROCEDURE (OUTPATIENT)
Dept: ULTRASOUND IMAGING | Facility: CLINIC | Age: 50
End: 2025-08-25
Attending: FAMILY MEDICINE
Payer: COMMERCIAL

## 2025-08-25 ENCOUNTER — ANCILLARY PROCEDURE (OUTPATIENT)
Dept: MAMMOGRAPHY | Facility: CLINIC | Age: 50
End: 2025-08-25
Attending: FAMILY MEDICINE
Payer: COMMERCIAL

## 2025-08-25 ENCOUNTER — OFFICE VISIT (OUTPATIENT)
Dept: OPHTHALMOLOGY | Facility: CLINIC | Age: 50
End: 2025-08-25
Payer: COMMERCIAL

## 2025-08-25 DIAGNOSIS — N64.4 BREAST PAIN, LEFT: ICD-10-CM

## 2025-08-25 PROCEDURE — 76642 ULTRASOUND BREAST LIMITED: CPT | Mod: LT | Performed by: RADIOLOGY

## 2025-08-25 PROCEDURE — G0279 TOMOSYNTHESIS, MAMMO: HCPCS | Performed by: RADIOLOGY

## 2025-08-25 PROCEDURE — 77066 DX MAMMO INCL CAD BI: CPT | Performed by: RADIOLOGY

## 2025-08-25 ASSESSMENT — CONF VISUAL FIELD
OS_INFERIOR_TEMPORAL_RESTRICTION: 0
OD_SUPERIOR_NASAL_RESTRICTION: 0
OS_SUPERIOR_NASAL_RESTRICTION: 0
OD_SUPERIOR_TEMPORAL_RESTRICTION: 0
OS_INFERIOR_NASAL_RESTRICTION: 0
OD_INFERIOR_NASAL_RESTRICTION: 0
OD_NORMAL: 1
OS_NORMAL: 1
OS_SUPERIOR_TEMPORAL_RESTRICTION: 0
OD_INFERIOR_TEMPORAL_RESTRICTION: 0

## 2025-08-25 ASSESSMENT — TONOMETRY
OD_IOP_MMHG: 20
IOP_METHOD: APPLANATION
OS_IOP_MMHG: 22

## 2025-08-25 ASSESSMENT — REFRACTION_MANIFEST
OS_ADD: +2.00
OD_ADD: +2.00
OD_SPHERE: -1.25
OD_AXIS: 088
OS_CYLINDER: +0.25
OD_CYLINDER: +0.75
OS_AXIS: 060
OS_SPHERE: PLANO

## 2025-08-25 ASSESSMENT — VISUAL ACUITY
OD_PH_SC: 20/80
OS_SC+: -1
OS_SC: J7
OD_SC: 20/150
METHOD: SNELLEN - LINEAR
OD_SC: J16
OS_SC: 20/20

## 2025-08-25 ASSESSMENT — CUP TO DISC RATIO
OD_RATIO: 0.6
OS_RATIO: 0.7

## 2025-08-25 ASSESSMENT — EXTERNAL EXAM - LEFT EYE: OS_EXAM: NORMAL

## 2025-08-25 ASSESSMENT — EXTERNAL EXAM - RIGHT EYE: OD_EXAM: NORMAL

## 2025-08-25 ASSESSMENT — SLIT LAMP EXAM - LIDS
COMMENTS: NORMAL
COMMENTS: NORMAL

## 2025-08-30 PROBLEM — Z96.1 PSEUDOPHAKIA: Status: ACTIVE | Noted: 2025-08-30

## 2025-08-30 PROBLEM — H20.9 IRITIS: Status: ACTIVE | Noted: 2025-08-30

## 2025-08-30 PROBLEM — H35.341 MACULAR HOLE OF RIGHT EYE: Status: ACTIVE | Noted: 2025-08-30

## 2025-08-30 PROBLEM — H40.003 GLAUCOMA SUSPECT OF BOTH EYES: Status: ACTIVE | Noted: 2025-08-30

## (undated) RX ORDER — ALBUTEROL SULFATE 0.83 MG/ML
SOLUTION RESPIRATORY (INHALATION)
Status: DISPENSED
Start: 2017-07-18